# Patient Record
Sex: MALE | Employment: OTHER | URBAN - METROPOLITAN AREA
[De-identification: names, ages, dates, MRNs, and addresses within clinical notes are randomized per-mention and may not be internally consistent; named-entity substitution may affect disease eponyms.]

---

## 2021-07-22 ENCOUNTER — APPOINTMENT (OUTPATIENT)
Dept: CT IMAGING | Age: 66
End: 2021-07-22
Payer: MEDICARE

## 2021-07-22 ENCOUNTER — HOSPITAL ENCOUNTER (INPATIENT)
Age: 66
LOS: 1 days | DRG: 023 | End: 2021-07-24
Attending: EMERGENCY MEDICINE | Admitting: PSYCHIATRY & NEUROLOGY
Payer: MEDICARE

## 2021-07-22 ENCOUNTER — HOSPITAL ENCOUNTER (EMERGENCY)
Age: 66
Discharge: ANOTHER HOSPITAL | End: 2021-07-22
Payer: MEDICARE

## 2021-07-22 DIAGNOSIS — I61.9 INTRAPARENCHYMAL HEMORRHAGE OF BRAIN (HCC): Primary | ICD-10-CM

## 2021-07-22 PROCEDURE — 99291 CRITICAL CARE FIRST HOUR: CPT | Performed by: PSYCHIATRY & NEUROLOGY

## 2021-07-22 PROCEDURE — 70450 CT HEAD/BRAIN W/O DYE: CPT

## 2021-07-22 PROCEDURE — 2500000003 HC RX 250 WO HCPCS

## 2021-07-22 PROCEDURE — 6360000002 HC RX W HCPCS: Performed by: PSYCHIATRY & NEUROLOGY

## 2021-07-22 PROCEDURE — 6360000002 HC RX W HCPCS

## 2021-07-22 PROCEDURE — 99282 EMERGENCY DEPT VISIT SF MDM: CPT

## 2021-07-22 RX ADMIN — ONDANSETRON 4 MG: 2 INJECTION, SOLUTION INTRAMUSCULAR; INTRAVENOUS at 23:41

## 2021-07-22 RX ADMIN — ONDANSETRON 4 MG: 2 INJECTION, SOLUTION INTRAMUSCULAR; INTRAVENOUS at 23:10

## 2021-07-22 RX ADMIN — NICARDIPINE HYDROCHLORIDE 5 MG/HR: 0.1 INJECTION INTRAVENOUS at 23:43

## 2021-07-22 RX ADMIN — PROTHROMBIN, COAGULATION FACTOR VII HUMAN, COAGULATION FACTOR IX HUMAN, COAGULATION FACTOR X HUMAN, PROTEIN C, PROTEIN S HUMAN, AND WATER 2000 UNITS: KIT at 23:34

## 2021-07-23 ENCOUNTER — APPOINTMENT (OUTPATIENT)
Dept: CT IMAGING | Age: 66
DRG: 023 | End: 2021-07-23
Payer: MEDICARE

## 2021-07-23 ENCOUNTER — APPOINTMENT (OUTPATIENT)
Dept: MRI IMAGING | Age: 66
DRG: 023 | End: 2021-07-23
Payer: MEDICARE

## 2021-07-23 ENCOUNTER — APPOINTMENT (OUTPATIENT)
Dept: GENERAL RADIOLOGY | Age: 66
DRG: 023 | End: 2021-07-23
Payer: MEDICARE

## 2021-07-23 VITALS
OXYGEN SATURATION: 86 % | TEMPERATURE: 99.7 F | DIASTOLIC BLOOD PRESSURE: 84 MMHG | HEART RATE: 113 BPM | HEIGHT: 70 IN | BODY MASS INDEX: 37.22 KG/M2 | RESPIRATION RATE: 20 BRPM | WEIGHT: 260 LBS | SYSTOLIC BLOOD PRESSURE: 167 MMHG

## 2021-07-23 PROBLEM — I63.511 STROKE DUE TO OCCLUSION OF RIGHT MIDDLE CEREBRAL ARTERY (HCC): Status: ACTIVE | Noted: 2021-07-23

## 2021-07-23 LAB
-: NORMAL
ABO/RH: NORMAL
ALLEN TEST: ABNORMAL
ALLEN TEST: ABNORMAL
AMORPHOUS: NORMAL
AMPHETAMINE SCREEN URINE: NEGATIVE
ANION GAP SERPL CALCULATED.3IONS-SCNC: 18 MMOL/L (ref 9–17)
ANION GAP SERPL CALCULATED.3IONS-SCNC: 18 MMOL/L (ref 9–17)
ANTIBODY SCREEN: NEGATIVE
ARM BAND NUMBER: NORMAL
BACTERIA: NORMAL
BARBITURATE SCREEN URINE: NEGATIVE
BENZODIAZEPINE SCREEN, URINE: NEGATIVE
BILIRUBIN URINE: NEGATIVE
BLOOD BANK SPECIMEN: ABNORMAL
BUN BLDV-MCNC: 11 MG/DL (ref 8–23)
BUN BLDV-MCNC: 12 MG/DL (ref 8–23)
BUN/CREAT BLD: ABNORMAL (ref 9–20)
BUPRENORPHINE URINE: ABNORMAL
CALCIUM SERPL-MCNC: 8.9 MG/DL (ref 8.6–10.4)
CANNABINOID SCREEN URINE: POSITIVE
CARBOXYHEMOGLOBIN: 1 % (ref 0–5)
CASTS UA: NORMAL /LPF (ref 0–8)
CHLORIDE BLD-SCNC: 100 MMOL/L (ref 98–107)
CHLORIDE BLD-SCNC: 107 MMOL/L (ref 98–107)
CHOLESTEROL/HDL RATIO: 2
CHOLESTEROL: 152 MG/DL
CO2: 18 MMOL/L (ref 20–31)
CO2: 21 MMOL/L (ref 20–31)
COCAINE METABOLITE, URINE: NEGATIVE
COLOR: YELLOW
COMMENT UA: ABNORMAL
CREAT SERPL-MCNC: 0.44 MG/DL (ref 0.7–1.2)
CREAT SERPL-MCNC: 0.47 MG/DL (ref 0.7–1.2)
CRYSTALS, UA: NORMAL /HPF
EPITHELIAL CELLS UA: NORMAL /HPF (ref 0–5)
ESTIMATED AVERAGE GLUCOSE: 108 MG/DL
ETHANOL PERCENT: <0.01 %
ETHANOL: <10 MG/DL
EXPIRATION DATE: NORMAL
FIO2: 100
FIO2: ABNORMAL
GFR AFRICAN AMERICAN: >60 ML/MIN
GFR AFRICAN AMERICAN: ABNORMAL ML/MIN
GFR NON-AFRICAN AMERICAN: >60 ML/MIN
GFR NON-AFRICAN AMERICAN: ABNORMAL ML/MIN
GFR SERPL CREATININE-BSD FRML MDRD: ABNORMAL ML/MIN/{1.73_M2}
GLUCOSE BLD-MCNC: 122 MG/DL (ref 70–99)
GLUCOSE BLD-MCNC: 228 MG/DL (ref 70–99)
GLUCOSE URINE: NEGATIVE
HBA1C MFR BLD: 5.4 % (ref 4–6)
HCG QUALITATIVE: ABNORMAL
HCO3 VENOUS: 22.7 MMOL/L (ref 24–30)
HCT VFR BLD CALC: 44.4 % (ref 40.7–50.3)
HCT VFR BLD CALC: 45.3 % (ref 40.7–50.3)
HDLC SERPL-MCNC: 76 MG/DL
HEMOGLOBIN: 14.6 G/DL (ref 13–17)
HEMOGLOBIN: 14.7 G/DL (ref 13–17)
INR BLD: 1
KETONES, URINE: ABNORMAL
LDL CHOLESTEROL: 63 MG/DL (ref 0–130)
LEUKOCYTE ESTERASE, URINE: NEGATIVE
MCH RBC QN AUTO: 30.5 PG (ref 25.2–33.5)
MCH RBC QN AUTO: 30.5 PG (ref 25.2–33.5)
MCHC RBC AUTO-ENTMCNC: 32.2 G/DL (ref 28.4–34.8)
MCHC RBC AUTO-ENTMCNC: 33.1 G/DL (ref 28.4–34.8)
MCV RBC AUTO: 92.1 FL (ref 82.6–102.9)
MCV RBC AUTO: 94.6 FL (ref 82.6–102.9)
MDMA URINE: ABNORMAL
METHADONE SCREEN, URINE: NEGATIVE
METHAMPHETAMINE, URINE: ABNORMAL
METHEMOGLOBIN: ABNORMAL % (ref 0–1.5)
MODE: ABNORMAL
MODE: ABNORMAL
MRSA, DNA, NASAL: NORMAL
MUCUS: NORMAL
NEGATIVE BASE EXCESS, ART: ABNORMAL (ref 0–2)
NEGATIVE BASE EXCESS, VEN: 1.7 MMOL/L (ref 0–2)
NITRITE, URINE: NEGATIVE
NOTIFICATION TIME: ABNORMAL
NOTIFICATION: ABNORMAL
NRBC AUTOMATED: 0 PER 100 WBC
NRBC AUTOMATED: 0 PER 100 WBC
O2 DEVICE/FLOW/%: ABNORMAL
O2 DEVICE/FLOW/%: ABNORMAL
O2 SAT, VEN: 96.4 % (ref 60–85)
OPIATES, URINE: NEGATIVE
OTHER OBSERVATIONS UA: NORMAL
OXYCODONE SCREEN URINE: NEGATIVE
OXYHEMOGLOBIN: ABNORMAL % (ref 95–98)
PARTIAL THROMBOPLASTIN TIME: 24.3 SEC (ref 20.5–30.5)
PATIENT TEMP: 37
PATIENT TEMP: ABNORMAL
PCO2, VEN, TEMP ADJ: ABNORMAL MMHG (ref 39–55)
PCO2, VEN: 39.5 (ref 39–55)
PDW BLD-RTO: 13.2 % (ref 11.8–14.4)
PDW BLD-RTO: 13.3 % (ref 11.8–14.4)
PEEP/CPAP: ABNORMAL
PH UA: 8 (ref 5–8)
PH VENOUS: 7.38 (ref 7.32–7.42)
PH, VEN, TEMP ADJ: ABNORMAL (ref 7.32–7.42)
PHENCYCLIDINE, URINE: NEGATIVE
PLATELET # BLD: 153 K/UL (ref 138–453)
PLATELET # BLD: 214 K/UL (ref 138–453)
PMV BLD AUTO: 11.3 FL (ref 8.1–13.5)
PMV BLD AUTO: 11.9 FL (ref 8.1–13.5)
PO2, VEN, TEMP ADJ: ABNORMAL MMHG (ref 30–50)
PO2, VEN: 87.8 (ref 30–50)
POC HCO3: 26 MMOL/L (ref 21–28)
POC O2 SATURATION: 100 % (ref 94–98)
POC PCO2 TEMP: ABNORMAL MM HG
POC PCO2: 43.8 MM HG (ref 35–48)
POC PH TEMP: ABNORMAL
POC PH: 7.38 (ref 7.35–7.45)
POC PO2 TEMP: ABNORMAL MM HG
POC PO2: 373.7 MM HG (ref 83–108)
POSITIVE BASE EXCESS, ART: 0 (ref 0–3)
POSITIVE BASE EXCESS, VEN: ABNORMAL MMOL/L (ref 0–2)
POTASSIUM SERPL-SCNC: 3.5 MMOL/L (ref 3.7–5.3)
POTASSIUM SERPL-SCNC: 4 MMOL/L (ref 3.7–5.3)
PROPOXYPHENE, URINE: ABNORMAL
PROTEIN UA: ABNORMAL
PROTHROMBIN TIME: 10.3 SEC (ref 9.1–12.3)
PSV: ABNORMAL
PT. POSITION: ABNORMAL
RBC # BLD: 4.79 M/UL (ref 4.21–5.77)
RBC # BLD: 4.82 M/UL (ref 4.21–5.77)
RBC UA: NORMAL /HPF (ref 0–4)
RENAL EPITHELIAL, UA: NORMAL /HPF
RESPIRATORY RATE: ABNORMAL
SAMPLE SITE: ABNORMAL
SAMPLE SITE: ABNORMAL
SARS-COV-2, RAPID: NOT DETECTED
SET RATE: ABNORMAL
SODIUM BLD-SCNC: 139 MMOL/L (ref 135–144)
SODIUM BLD-SCNC: 143 MMOL/L (ref 135–144)
SODIUM BLD-SCNC: 146 MMOL/L (ref 135–144)
SPECIFIC GRAVITY UA: 1.01 (ref 1–1.03)
SPECIMEN DESCRIPTION: NORMAL
SPECIMEN DESCRIPTION: NORMAL
TCO2 (CALC), ART: ABNORMAL MMOL/L (ref 22–29)
TEST INFORMATION: ABNORMAL
TEXT FOR RESPIRATORY: ABNORMAL
TOTAL HB: ABNORMAL G/DL (ref 12–16)
TOTAL RATE: ABNORMAL
TRICHOMONAS: NORMAL
TRICYCLIC ANTIDEPRESSANTS, UR: ABNORMAL
TRIGL SERPL-MCNC: 67 MG/DL
TROPONIN INTERP: ABNORMAL
TROPONIN T: ABNORMAL NG/ML
TROPONIN, HIGH SENSITIVITY: 32 NG/L (ref 0–22)
TURBIDITY: CLEAR
URINE HGB: ABNORMAL
UROBILINOGEN, URINE: NORMAL
VLDLC SERPL CALC-MCNC: NORMAL MG/DL (ref 1–30)
VT: ABNORMAL
WBC # BLD: 20.2 K/UL (ref 3.5–11.3)
WBC # BLD: 8.3 K/UL (ref 3.5–11.3)
WBC UA: NORMAL /HPF (ref 0–5)
YEAST: NORMAL

## 2021-07-23 PROCEDURE — 84520 ASSAY OF UREA NITROGEN: CPT

## 2021-07-23 PROCEDURE — 6360000002 HC RX W HCPCS: Performed by: STUDENT IN AN ORGANIZED HEALTH CARE EDUCATION/TRAINING PROGRAM

## 2021-07-23 PROCEDURE — 84295 ASSAY OF SERUM SODIUM: CPT

## 2021-07-23 PROCEDURE — 009630Z DRAINAGE OF CEREBRAL VENTRICLE WITH DRAINAGE DEVICE, PERCUTANEOUS APPROACH: ICD-10-PCS | Performed by: NEUROLOGICAL SURGERY

## 2021-07-23 PROCEDURE — 94002 VENT MGMT INPAT INIT DAY: CPT

## 2021-07-23 PROCEDURE — 6360000002 HC RX W HCPCS

## 2021-07-23 PROCEDURE — G0480 DRUG TEST DEF 1-7 CLASSES: HCPCS

## 2021-07-23 PROCEDURE — 99291 CRITICAL CARE FIRST HOUR: CPT | Performed by: PSYCHIATRY & NEUROLOGY

## 2021-07-23 PROCEDURE — 2500000003 HC RX 250 WO HCPCS: Performed by: PSYCHIATRY & NEUROLOGY

## 2021-07-23 PROCEDURE — 72125 CT NECK SPINE W/O DYE: CPT

## 2021-07-23 PROCEDURE — 2580000003 HC RX 258: Performed by: STUDENT IN AN ORGANIZED HEALTH CARE EDUCATION/TRAINING PROGRAM

## 2021-07-23 PROCEDURE — 30283B1 TRANSFUSION OF NONAUTOLOGOUS 4-FACTOR PROTHROMBIN COMPLEX CONCENTRATE INTO VEIN, PERCUTANEOUS APPROACH: ICD-10-PCS | Performed by: NEUROLOGICAL SURGERY

## 2021-07-23 PROCEDURE — 70450 CT HEAD/BRAIN W/O DYE: CPT

## 2021-07-23 PROCEDURE — 80051 ELECTROLYTE PANEL: CPT

## 2021-07-23 PROCEDURE — 81001 URINALYSIS AUTO W/SCOPE: CPT

## 2021-07-23 PROCEDURE — APPNB30 APP NON BILLABLE TIME 0-30 MINS: Performed by: NURSE PRACTITIONER

## 2021-07-23 PROCEDURE — 6360000002 HC RX W HCPCS: Performed by: NURSE PRACTITIONER

## 2021-07-23 PROCEDURE — 36600 WITHDRAWAL OF ARTERIAL BLOOD: CPT

## 2021-07-23 PROCEDURE — 87635 SARS-COV-2 COVID-19 AMP PRB: CPT

## 2021-07-23 PROCEDURE — 2500000003 HC RX 250 WO HCPCS: Performed by: NURSE PRACTITIONER

## 2021-07-23 PROCEDURE — 84484 ASSAY OF TROPONIN QUANT: CPT

## 2021-07-23 PROCEDURE — 86900 BLOOD TYPING SEROLOGIC ABO: CPT

## 2021-07-23 PROCEDURE — 2500000003 HC RX 250 WO HCPCS: Performed by: STUDENT IN AN ORGANIZED HEALTH CARE EDUCATION/TRAINING PROGRAM

## 2021-07-23 PROCEDURE — 3209999900 CT THORACIC SPINE TRAUMA RECONSTRUCTION

## 2021-07-23 PROCEDURE — 51702 INSERT TEMP BLADDER CATH: CPT

## 2021-07-23 PROCEDURE — C9248 INJ, CLEVIDIPINE BUTYRATE: HCPCS | Performed by: STUDENT IN AN ORGANIZED HEALTH CARE EDUCATION/TRAINING PROGRAM

## 2021-07-23 PROCEDURE — 0BH18EZ INSERTION OF ENDOTRACHEAL AIRWAY INTO TRACHEA, VIA NATURAL OR ARTIFICIAL OPENING ENDOSCOPIC: ICD-10-PCS | Performed by: EMERGENCY MEDICINE

## 2021-07-23 PROCEDURE — 71045 X-RAY EXAM CHEST 1 VIEW: CPT

## 2021-07-23 PROCEDURE — 80048 BASIC METABOLIC PNL TOTAL CA: CPT

## 2021-07-23 PROCEDURE — 2000000003 HC NEURO ICU R&B

## 2021-07-23 PROCEDURE — 70498 CT ANGIOGRAPHY NECK: CPT

## 2021-07-23 PROCEDURE — 36415 COLL VENOUS BLD VENIPUNCTURE: CPT

## 2021-07-23 PROCEDURE — 06HY33Z INSERTION OF INFUSION DEVICE INTO LOWER VEIN, PERCUTANEOUS APPROACH: ICD-10-PCS | Performed by: EMERGENCY MEDICINE

## 2021-07-23 PROCEDURE — 82565 ASSAY OF CREATININE: CPT

## 2021-07-23 PROCEDURE — 6360000004 HC RX CONTRAST MEDICATION: Performed by: SURGERY

## 2021-07-23 PROCEDURE — 83036 HEMOGLOBIN GLYCOSYLATED A1C: CPT

## 2021-07-23 PROCEDURE — 5A1935Z RESPIRATORY VENTILATION, LESS THAN 24 CONSECUTIVE HOURS: ICD-10-PCS | Performed by: SURGERY

## 2021-07-23 PROCEDURE — 85027 COMPLETE CBC AUTOMATED: CPT

## 2021-07-23 PROCEDURE — 82803 BLOOD GASES ANY COMBINATION: CPT

## 2021-07-23 PROCEDURE — 85610 PROTHROMBIN TIME: CPT

## 2021-07-23 PROCEDURE — 71260 CT THORAX DX C+: CPT

## 2021-07-23 PROCEDURE — C9248 INJ, CLEVIDIPINE BUTYRATE: HCPCS | Performed by: NURSE PRACTITIONER

## 2021-07-23 PROCEDURE — 82805 BLOOD GASES W/O2 SATURATION: CPT

## 2021-07-23 PROCEDURE — 87641 MR-STAPH DNA AMP PROBE: CPT

## 2021-07-23 PROCEDURE — 86901 BLOOD TYPING SEROLOGIC RH(D): CPT

## 2021-07-23 PROCEDURE — 6370000000 HC RX 637 (ALT 250 FOR IP): Performed by: PSYCHIATRY & NEUROLOGY

## 2021-07-23 PROCEDURE — 82947 ASSAY GLUCOSE BLOOD QUANT: CPT

## 2021-07-23 PROCEDURE — 3209999900 CT LUMBAR SPINE TRAUMA RECONSTRUCTION

## 2021-07-23 PROCEDURE — 85730 THROMBOPLASTIN TIME PARTIAL: CPT

## 2021-07-23 PROCEDURE — 80307 DRUG TEST PRSMV CHEM ANLYZR: CPT

## 2021-07-23 PROCEDURE — 84703 CHORIONIC GONADOTROPIN ASSAY: CPT

## 2021-07-23 PROCEDURE — 94761 N-INVAS EAR/PLS OXIMETRY MLT: CPT

## 2021-07-23 PROCEDURE — 2500000003 HC RX 250 WO HCPCS

## 2021-07-23 PROCEDURE — 86850 RBC ANTIBODY SCREEN: CPT

## 2021-07-23 PROCEDURE — 80061 LIPID PANEL: CPT

## 2021-07-23 RX ORDER — LABETALOL HYDROCHLORIDE 5 MG/ML
20 INJECTION, SOLUTION INTRAVENOUS EVERY 4 HOURS PRN
Status: DISCONTINUED | OUTPATIENT
Start: 2021-07-23 | End: 2021-07-23

## 2021-07-23 RX ORDER — GLYCOPYRROLATE 0.2 MG/ML
0.2 INJECTION INTRAMUSCULAR; INTRAVENOUS EVERY 4 HOURS PRN
Status: DISCONTINUED | OUTPATIENT
Start: 2021-07-23 | End: 2021-07-24 | Stop reason: HOSPADM

## 2021-07-23 RX ORDER — HYDRALAZINE HYDROCHLORIDE 20 MG/ML
10 INJECTION INTRAMUSCULAR; INTRAVENOUS
Status: DISCONTINUED | OUTPATIENT
Start: 2021-07-23 | End: 2021-07-23

## 2021-07-23 RX ORDER — 3% SODIUM CHLORIDE 3 G/100ML
50 INJECTION, SOLUTION INTRAVENOUS CONTINUOUS
Status: DISCONTINUED | OUTPATIENT
Start: 2021-07-23 | End: 2021-07-23

## 2021-07-23 RX ORDER — LEVETIRACETAM 10 MG/ML
INJECTION INTRAVASCULAR
Status: DISCONTINUED
Start: 2021-07-23 | End: 2021-07-23

## 2021-07-23 RX ORDER — LABETALOL HYDROCHLORIDE 5 MG/ML
10 INJECTION, SOLUTION INTRAVENOUS EVERY 4 HOURS PRN
Status: DISCONTINUED | OUTPATIENT
Start: 2021-07-23 | End: 2021-07-23

## 2021-07-23 RX ORDER — DEXTROSE MONOHYDRATE 50 MG/ML
100 INJECTION, SOLUTION INTRAVENOUS PRN
Status: DISCONTINUED | OUTPATIENT
Start: 2021-07-23 | End: 2021-07-23

## 2021-07-23 RX ORDER — LEVETIRACETAM 10 MG/ML
1000 INJECTION INTRAVASCULAR EVERY 12 HOURS
Status: DISCONTINUED | OUTPATIENT
Start: 2021-07-23 | End: 2021-07-23

## 2021-07-23 RX ORDER — DEXTROSE MONOHYDRATE 25 G/50ML
12.5 INJECTION, SOLUTION INTRAVENOUS PRN
Status: DISCONTINUED | OUTPATIENT
Start: 2021-07-23 | End: 2021-07-23

## 2021-07-23 RX ORDER — LEVETIRACETAM 10 MG/ML
1000 INJECTION INTRAVASCULAR ONCE
Status: DISCONTINUED | OUTPATIENT
Start: 2021-07-23 | End: 2021-07-23

## 2021-07-23 RX ORDER — LABETALOL 200 MG/1
200 TABLET, FILM COATED ORAL EVERY 8 HOURS SCHEDULED
Status: DISCONTINUED | OUTPATIENT
Start: 2021-07-23 | End: 2021-07-23

## 2021-07-23 RX ORDER — CHLORHEXIDINE GLUCONATE 0.12 MG/ML
15 RINSE ORAL 2 TIMES DAILY
Status: DISCONTINUED | OUTPATIENT
Start: 2021-07-23 | End: 2021-07-23

## 2021-07-23 RX ORDER — NICOTINE POLACRILEX 4 MG
15 LOZENGE BUCCAL PRN
Status: DISCONTINUED | OUTPATIENT
Start: 2021-07-23 | End: 2021-07-23

## 2021-07-23 RX ORDER — NICARDIPINE HYDROCHLORIDE 0.1 MG/ML
INJECTION INTRAVENOUS
Status: COMPLETED
Start: 2021-07-23 | End: 2021-07-23

## 2021-07-23 RX ORDER — LEVETIRACETAM 5 MG/ML
500 INJECTION INTRAVASCULAR EVERY 12 HOURS
Status: DISCONTINUED | OUTPATIENT
Start: 2021-07-23 | End: 2021-07-24 | Stop reason: HOSPADM

## 2021-07-23 RX ORDER — MORPHINE SULFATE 4 MG/ML
4 INJECTION, SOLUTION INTRAMUSCULAR; INTRAVENOUS
Status: DISCONTINUED | OUTPATIENT
Start: 2021-07-23 | End: 2021-07-24 | Stop reason: HOSPADM

## 2021-07-23 RX ORDER — ACETAMINOPHEN 325 MG/1
650 TABLET ORAL EVERY 4 HOURS PRN
Status: DISCONTINUED | OUTPATIENT
Start: 2021-07-23 | End: 2021-07-23

## 2021-07-23 RX ORDER — ONDANSETRON 2 MG/ML
4 INJECTION INTRAMUSCULAR; INTRAVENOUS EVERY 6 HOURS PRN
Status: DISCONTINUED | OUTPATIENT
Start: 2021-07-23 | End: 2021-07-23

## 2021-07-23 RX ORDER — ONDANSETRON 2 MG/ML
INJECTION INTRAMUSCULAR; INTRAVENOUS DAILY PRN
Status: DISCONTINUED | OUTPATIENT
Start: 2021-07-22 | End: 2021-07-23 | Stop reason: HOSPADM

## 2021-07-23 RX ORDER — MORPHINE SULFATE 2 MG/ML
2 INJECTION, SOLUTION INTRAMUSCULAR; INTRAVENOUS
Status: DISCONTINUED | OUTPATIENT
Start: 2021-07-23 | End: 2021-07-24 | Stop reason: HOSPADM

## 2021-07-23 RX ORDER — LORAZEPAM 2 MG/ML
0.5 INJECTION INTRAMUSCULAR
Status: DISCONTINUED | OUTPATIENT
Start: 2021-07-23 | End: 2021-07-24 | Stop reason: HOSPADM

## 2021-07-23 RX ORDER — MANNITOL 20 G/100ML
120 INJECTION, SOLUTION INTRAVENOUS ONCE
Status: COMPLETED | OUTPATIENT
Start: 2021-07-23 | End: 2021-07-23

## 2021-07-23 RX ORDER — SODIUM CHLORIDE 234 MG/ML
60 INJECTION, SOLUTION INTRAVENOUS ONCE
Status: DISCONTINUED | OUTPATIENT
Start: 2021-07-23 | End: 2021-07-23

## 2021-07-23 RX ORDER — MANNITOL 20 G/100ML
150 INJECTION, SOLUTION INTRAVENOUS ONCE
Status: COMPLETED | OUTPATIENT
Start: 2021-07-23 | End: 2021-07-23

## 2021-07-23 RX ORDER — LABETALOL HYDROCHLORIDE 5 MG/ML
10 INJECTION, SOLUTION INTRAVENOUS
Status: DISCONTINUED | OUTPATIENT
Start: 2021-07-23 | End: 2021-07-23

## 2021-07-23 RX ORDER — MANNITOL 20 G/100ML
INJECTION, SOLUTION INTRAVENOUS
Status: COMPLETED
Start: 2021-07-23 | End: 2021-07-23

## 2021-07-23 RX ORDER — ONDANSETRON 4 MG/1
4 TABLET, ORALLY DISINTEGRATING ORAL EVERY 8 HOURS PRN
Status: DISCONTINUED | OUTPATIENT
Start: 2021-07-23 | End: 2021-07-23

## 2021-07-23 RX ORDER — SODIUM CHLORIDE 9 MG/ML
50 INJECTION, SOLUTION INTRAVENOUS ONCE
Status: DISCONTINUED | OUTPATIENT
Start: 2021-07-23 | End: 2021-07-24 | Stop reason: HOSPADM

## 2021-07-23 RX ORDER — HYDRALAZINE HYDROCHLORIDE 20 MG/ML
10 INJECTION INTRAMUSCULAR; INTRAVENOUS EVERY 6 HOURS PRN
Status: DISCONTINUED | OUTPATIENT
Start: 2021-07-23 | End: 2021-07-23

## 2021-07-23 RX ORDER — MANNITOL 20 G/100ML
INJECTION, SOLUTION INTRAVENOUS
Status: DISCONTINUED
Start: 2021-07-23 | End: 2021-07-23

## 2021-07-23 RX ORDER — SODIUM CHLORIDE 234 MG/ML
60 INJECTION, SOLUTION INTRAVENOUS ONCE
Status: COMPLETED | OUTPATIENT
Start: 2021-07-23 | End: 2021-07-23

## 2021-07-23 RX ORDER — ATORVASTATIN CALCIUM 20 MG/1
20 TABLET, FILM COATED ORAL NIGHTLY
Status: DISCONTINUED | OUTPATIENT
Start: 2021-07-23 | End: 2021-07-23

## 2021-07-23 RX ORDER — NICARDIPINE HYDROCHLORIDE 0.1 MG/ML
3-15 INJECTION INTRAVENOUS CONTINUOUS
Status: DISCONTINUED | OUTPATIENT
Start: 2021-07-23 | End: 2021-07-23

## 2021-07-23 RX ORDER — LORAZEPAM 2 MG/ML
1 INJECTION INTRAMUSCULAR
Status: DISCONTINUED | OUTPATIENT
Start: 2021-07-23 | End: 2021-07-24 | Stop reason: HOSPADM

## 2021-07-23 RX ORDER — FENTANYL CITRATE 50 UG/ML
INJECTION, SOLUTION INTRAMUSCULAR; INTRAVENOUS
Status: COMPLETED
Start: 2021-07-23 | End: 2021-07-23

## 2021-07-23 RX ORDER — NICARDIPINE HYDROCHLORIDE 0.1 MG/ML
INJECTION INTRAVENOUS CONTINUOUS PRN
Status: DISCONTINUED | OUTPATIENT
Start: 2021-07-22 | End: 2021-07-23 | Stop reason: HOSPADM

## 2021-07-23 RX ADMIN — MANNITOL 120 G: 20 INJECTION, SOLUTION INTRAVENOUS at 10:12

## 2021-07-23 RX ADMIN — MORPHINE SULFATE 2 MG: 2 INJECTION, SOLUTION INTRAMUSCULAR; INTRAVENOUS at 16:37

## 2021-07-23 RX ADMIN — MORPHINE SULFATE 4 MG: 4 INJECTION INTRAVENOUS at 17:20

## 2021-07-23 RX ADMIN — FENTANYL CITRATE 75 MCG: 50 INJECTION, SOLUTION INTRAMUSCULAR; INTRAVENOUS at 02:52

## 2021-07-23 RX ADMIN — CLEVIPIDINE 2 MG/HR: 0.5 EMULSION INTRAVENOUS at 01:59

## 2021-07-23 RX ADMIN — LORAZEPAM 1 MG: 2 INJECTION INTRAMUSCULAR; INTRAVENOUS at 18:10

## 2021-07-23 RX ADMIN — SODIUM CHLORIDE 50 ML/HR: 3 INJECTION, SOLUTION INTRAVENOUS at 14:55

## 2021-07-23 RX ADMIN — MORPHINE SULFATE 4 MG: 4 INJECTION INTRAVENOUS at 18:09

## 2021-07-23 RX ADMIN — IOPAMIDOL 130 ML: 755 INJECTION, SOLUTION INTRAVENOUS at 00:26

## 2021-07-23 RX ADMIN — LORAZEPAM 1 MG: 2 INJECTION INTRAMUSCULAR; INTRAVENOUS at 17:20

## 2021-07-23 RX ADMIN — LEVETIRACETAM 2000 MG: 100 INJECTION, SOLUTION INTRAVENOUS at 02:35

## 2021-07-23 RX ADMIN — CLEVIPIDINE 18 MG/HR: 0.5 EMULSION INTRAVENOUS at 09:49

## 2021-07-23 RX ADMIN — Medication 1500 UNITS: at 01:35

## 2021-07-23 RX ADMIN — Medication 12.5 MCG/HR: at 04:53

## 2021-07-23 RX ADMIN — MORPHINE SULFATE 4 MG: 4 INJECTION INTRAVENOUS at 18:54

## 2021-07-23 RX ADMIN — LORAZEPAM 1 MG: 2 INJECTION INTRAMUSCULAR; INTRAVENOUS at 19:53

## 2021-07-23 RX ADMIN — LORAZEPAM 1 MG: 2 INJECTION INTRAMUSCULAR; INTRAVENOUS at 18:54

## 2021-07-23 RX ADMIN — LORAZEPAM 1 MG: 2 INJECTION INTRAMUSCULAR; INTRAVENOUS at 16:36

## 2021-07-23 RX ADMIN — MANNITOL 150 G: 20 INJECTION, SOLUTION INTRAVENOUS at 01:23

## 2021-07-23 RX ADMIN — SODIUM CHLORIDE 50 ML/HR: 3 INJECTION, SOLUTION INTRAVENOUS at 04:47

## 2021-07-23 RX ADMIN — LORAZEPAM 1 MG: 2 INJECTION INTRAMUSCULAR; INTRAVENOUS at 17:42

## 2021-07-23 RX ADMIN — LABETALOL HYDROCHLORIDE 200 MG: 200 TABLET, FILM COATED ORAL at 10:00

## 2021-07-23 RX ADMIN — MORPHINE SULFATE 4 MG: 4 INJECTION INTRAVENOUS at 17:42

## 2021-07-23 RX ADMIN — SODIUM CHLORIDE 240 MEQ: 234 INJECTION, SOLUTION, CONCENTRATE INTRAVENOUS at 10:16

## 2021-07-23 RX ADMIN — HYDRALAZINE HYDROCHLORIDE 10 MG: 20 INJECTION INTRAMUSCULAR; INTRAVENOUS at 09:27

## 2021-07-23 RX ADMIN — MORPHINE SULFATE 4 MG: 4 INJECTION INTRAVENOUS at 19:53

## 2021-07-23 RX ADMIN — SODIUM CHLORIDE 240 MEQ: 234 INJECTION INTRAMUSCULAR; INTRAVENOUS; SUBCUTANEOUS at 02:47

## 2021-07-23 RX ADMIN — NICARDIPINE HYDROCHLORIDE 15 MG/HR: 0.1 INJECTION INTRAVENOUS at 01:41

## 2021-07-23 RX ADMIN — CLEVIPIDINE 4 MG/HR: 0.5 EMULSION INTRAVENOUS at 14:56

## 2021-07-23 RX ADMIN — FAMOTIDINE 20 MG: 10 INJECTION INTRAVENOUS at 08:44

## 2021-07-23 RX ADMIN — CHLORHEXIDINE GLUCONATE 0.12% ORAL RINSE 15 ML: 1.2 LIQUID ORAL at 08:45

## 2021-07-23 ASSESSMENT — PAIN SCALES - GENERAL: PAINLEVEL_OUTOF10: 10

## 2021-07-23 ASSESSMENT — PULMONARY FUNCTION TESTS
PIF_VALUE: 16
PIF_VALUE: 22
PIF_VALUE: 23
PIF_VALUE: 22

## 2021-07-23 NOTE — PROCEDURES
Arterial Line Placement Procedure Note                     Indication: Need for serial blood work, arterial blood gases, mechanical ventilation and need for strict BP control in setting of ICH    Consent: The family members were counseled regarding the procedure, its indications, risks, potential complications and alternatives, and any questions were answered. Consent was obtained to proceed. Karl's Test: Not performed    Procedure: The skin over the right radial artery was prepped with chlorhexidine and Chloraprep. Local anesthesia was not performed. A 20 gauge arterial line catheter was then inserted, over a needle, into the vessel. The transducer set was then attached and securely fastened to the skin without sutures and with an adhesive dressing. Waveforms on the monitor were observed and found to be adequate. The patient had good distal perfusion after the procedure. The site was then dressed in a sterile fashion. The patient tolerated the procedure well.      Complications: None    CHETAN Salazar CNP  07/23/21 9:15 AM

## 2021-07-23 NOTE — H&P
Neuro ICU History & Physical    Patient Name: Charmaine FAJARDO  Patient : [id number]  Room/Bed:   Allergies: Not on File  Problem List: There is no problem list on file for this patient. CHIEF COMPLAINT     Right hemiplegia, complete aphasia    HPI    History Obtained From: Medical chart and medical staff    The patient is a 81 y.o. male with past medical history of hypertension, hyperlipidemia, right frontal stroke in 2018, atrial fibrillation on Eliquis presented with mobile stroke unit with right hemiplegia and complete expressive aphasia. LKW 9:30 pm. Compliant with eliquis. Initial NIHSS is 18. CTH in the [facility] showed Left thalamic hemorrhage 3.2 cm in size with subarachnoid extension in the   ventricular system. Initial SBP >200. Cardene was started. The pt was transferred to [facility] for further management. In route, the patient received 2500 units of Kcentra. At bedside, the patient was intubated and received paralytics. Of sedation. Pupils were 4 mm bilaterally and nonreactive. Repeated CT head was done and showed Left frontotemporal lobar acute intraparenchymal hemorrhage,  with surrounding vasogenic edema. Extension of hemorrhage into the left midbrain with surrounding vasogenic edema. Extension of acute hemorrhage within the ventricular system involving left greater than right lateral ventricles, 3rd ventricle. CTA of the head and neck with spot sign indicating active bleeding. 1500 units of Kcentra, mannitol 150 g and 23.4% hypertonic saline 60 mL were ordered stat. Neurosurgery was consulted stat and recommended EVD. CBC and BMP within normal limits with sodium around 139. ICH SCORE  (Estimate 30 Day Mortality)   POINTS   Age    < 80  ? 80     [x] 0  [] 1   GCS:    13-15                                 5-12                                   3-4         [] 0  [x] 1  [] 2   ICH Volume     < 30 mL (cc)                      ?  30 mL (cc)      [] 0  [x] 1   IVH      No Yes      [] 0  [x] 1   Location    Supratentorial Origin                   Infratentorial Origin     [] 0  [x] 1   TOTAL 4   0= 10%             1=13%           2= 26%              3=72%             4 = 97%         5 & 6 = 100%    Initial CT head in the [facility]      Repeated CTH        Admitted to ICU From: ED  Reason for ICU Admission: Intracranial bleed       PATIENT HISTORY   Past Medical History:    No past medical history on file. Past Surgical History:    No past surgical history on file. Social History:   Social History     Socioeconomic History    Marital status: Not on file     Spouse name: Not on file    Number of children: Not on file    Years of education: Not on file    Highest education level: Not on file   Occupational History    Not on file   Tobacco Use    Smoking status: Not on file   Substance and Sexual Activity    Alcohol use: Not on file    Drug use: Not on file    Sexual activity: Not on file   Other Topics Concern    Not on file   Social History Narrative    Not on file     Social Determinants of Health     Financial Resource Strain:     Difficulty of Paying Living Expenses:    Food Insecurity:     Worried About Running Out of Food in the Last Year:     [address] in the Last Year:    Transportation Needs:     Lack of Transportation (Medical):      Lack of Transportation (Non-Medical):    Physical Activity:     Days of Exercise per Week:     Minutes of Exercise per Session:    Stress:     Feeling of Stress :    Social Connections:     Frequency of Communication with Friends and Family:     Frequency of Social Gatherings with Friends and Family:     Attends [employer] Services:     Active Member of Clubs or Organizations:     Attends Club or Organization Meetings:     Marital Status:    Intimate Partner Violence:     Fear of Current or Ex-Partner:     Emotionally Abused:     Physically Abused:     Sexually Abused:        Family History:   No family history on file. Allergies:    Patient has no allergy information on record. Medications Prior to Admission:    Not in a hospital admission. Current Medications:  Current Facility-Administered Medications: levETIRAcetam (KEPPRA) 1000 mg/100 mL IVPB, 1,000 mg, Intravenous, Once  0.9 % sodium chloride infusion, 50 mL, Intravenous, Once  levETIRAcetam in NaCl (KEPPRA) 1000 MG/100ML solution, , ,   fentaNYL (SUBLIMAZE) 100 MCG/2ML injection, , ,   mannitol 20 % IVPB 150 g, 150 g, Intravenous, Once  prothrombin complex concentrate (human) (KCENTRA) infusion 1,500 Units, 1,500 Units, Intravenous, Once  0.9 % sodium chloride infusion, 50 mL, Intravenous, Once  mannitol 20 % IVPB, , ,   sodium chloride 23.4% IVPB (Central Line) 240 mEq, 60 mL, Intravenous, Once    REVIEW OF SYSTEMS     Intubated, unresponsive    PHYSICAL EXAM:     There were no vitals taken for this visit. There is no height or weight on file to calculate BMI.  []<16 Severe malnutrition  []16-16.99 Moderate malnutrition  []17-18.49 Mild malnutrition  []18.5-24.9 Normal  []25-29.9 Overweight (not obese)  []30-34.9 Obese class 1 (Low Risk)  []35-39.9 Obese class 2 (Moderate Risk)  []?40 Obese class 3 (High Risk)    PHYSICAL EXAM:  Intubated, responsive, the patient recently received paralytics  Pupils are 4 mm round and non reactive bilaterally, no corneals, no cough or gag reflex. The patient is not moving extremities to pain.     We will examine the patient after 1 to 2 hours  LABS AND IMAGING:     RECENT LABS:  CBC with Differential:    Lab Results   Component Value Date    WBC 8.3 07/23/2021    RBC 4.82 07/23/2021    HGB 14.7 07/23/2021    HCT 44.4 07/23/2021     07/23/2021    MCV 92.1 07/23/2021    MCH 30.5 07/23/2021    MCHC 33.1 07/23/2021    RDW 13.2 07/23/2021     BMP:    Lab Results   Component Value Date     07/23/2021    K 3.5 07/23/2021     07/23/2021    CO2 21 07/23/2021    BUN 12 07/23/2021 CREATININE 0.47 07/23/2021    GFRAA NOT REPORTED 07/23/2021    LABGLOM NOT REPORTED 07/23/2021    GLUCOSE 122 07/23/2021       RADIOLOGY:   CT HEAD WO CONTRAST    Addendum Date: 7/23/2021    ADDENDUM: The left frontotemporal lobar acute intraparenchymal hemorrhage also extends into and involves the left thalamus. Result Date: 7/23/2021  EXAMINATION: CT OF THE HEAD WITHOUT CONTRAST  7/23/2021 12:21 am TECHNIQUE: CT of the head was performed without the administration of intravenous contrast. Dose modulation, iterative reconstruction, and/or weight based adjustment of the mA/kV was utilized to reduce the radiation dose to as low as reasonably achievable. COMPARISON: None. HISTORY: ORDERING SYSTEM PROVIDED HISTORY: trauma TECHNOLOGIST PROVIDED HISTORY: trauma Reason for Exam: trauma Acuity: Unknown Type of Exam: Unknown Mechanism of Injury: fall FINDINGS: BRAIN/VENTRICLES: Large lobar intraparenchymal acute hemorrhage is seen involving the left frontotemporal lobes which measures 7.0 cm anteroposterior by 3.8 cm transverse by 5.4 cm craniocaudad with extension of acute hemorrhage into the ventricular system involving the left greater than right lateral, 3rd ventricles. Extension of hemorrhage into the left midbrain is also seen with surrounding vasogenic edema present. Mass effect related to in ventricular hemorrhage and the lobar hemorrhage causes rightward midline shift at the level of the septum pellucidum measuring 16 mm consistent with subfalcine herniation. Prominence of the lateral ventricles is seen suggesting CSF outflow obstruction. Multifocal hypoattenuation within cerebral white matter is seen consistent with moderate to severe microvascular ischemic disease. ORBITS: The visualized portion of the orbits demonstrate no acute abnormality. SINUSES: The visualized paranasal sinuses and mastoid air cells demonstrate no acute abnormality.  SOFT TISSUES/SKULL:  No acute abnormality of the visualized skull or soft tissues. 1. Left frontotemporal lobar acute intraparenchymal hemorrhage, with dimensions as discussed above, with surrounding vasogenic edema. 2. Extension of hemorrhage into the left midbrain with surrounding vasogenic edema. 3. Extension of acute hemorrhage within the ventricular system involving left greater than right lateral ventricles, 3rd ventricle. 4. Suspected associated CSF outflow obstruction with hydrocephalus involving the bilateral lateral ventricles. 5. Mass effect causes rightward midline shift at the level of the septum pellucidum measuring 16 mm consistent with subfalcine herniation. 6. Recommend neurosurgical consultation. 7. Moderate to severe cerebral white matter microvascular ischemic disease. Critical results were called by [facility] Core team to Dr. Beryle Contras On 7/23/2021 at 00:43. Dr. Beryle Contras reportedly unavailable to speak with radiologist but reports he is aware of findings. XR CHEST PORTABLE    Result Date: 7/23/2021  EXAMINATION: ONE XRAY VIEW OF THE CHEST 7/23/2021 12:32 am COMPARISON: None. HISTORY: ORDERING SYSTEM PROVIDED HISTORY: trauma TECHNOLOGIST PROVIDED HISTORY: trauma Reason for Exam: supine,et placement, poss stroke  fall Acuity: Unknown Type of Exam: Unknown FINDINGS: Enteric tube terminates 3 cm above the joel. Mile perihilar pulmonary vasculature and edema. Heart is mildly large in size. Enteric tube tip not visualized. However the enteric tube extends below the level of the hemidiaphragms. Minimal atelectasis     Satisfactory position endotracheal tube. Cardiomegaly with perihilar congestion and edema. CTA HEAD NECK W CONTRAST    Result Date: 7/23/2021  EXAMINATION: CTA OF THE HEAD AND NECK WITH CONTRAST 7/23/2021 12:24 am: TECHNIQUE: CTA of the head and neck was performed with the administration of intravenous contrast. Multiplanar reformatted images are provided for review. MIP images are provided for review.  Stenosis of the internal carotid arteries measured using NASCET criteria. Dose modulation, iterative reconstruction, and/or weight based adjustment of the mA/kV was utilized to reduce the radiation dose to as low as reasonably achievable. COMPARISON: None. HISTORY: ORDERING SYSTEM PROVIDED HISTORY: Head bleed TECHNOLOGIST PROVIDED HISTORY: Head bleed Decision Support Exception - unselect if not a suspected or confirmed emergency medical condition->Emergency Medical Condition (MA) FINDINGS: CTA NECK: AORTIC ARCH/ARCH VESSELS: No dissection or arterial injury. No significant stenosis of the brachiocephalic or subclavian arteries. CAROTID ARTERIES: No dissection, arterial injury, or hemodynamically significant stenosis by NASCET criteria. VERTEBRAL ARTERIES: No dissection, arterial injury, or significant stenosis. SOFT TISSUES: The lung apices are clear. No cervical or superior mediastinal lymphadenopathy. The larynx and pharynx are unremarkable. No acute abnormality of the salivary and thyroid glands. BONES: No acute osseous abnormality. CTA HEAD: ANTERIOR CIRCULATION: No significant stenosis of the intracranial internal carotid, anterior cerebral, or middle cerebral arteries. No aneurysm. POSTERIOR CIRCULATION: No significant stenosis of the vertebral, basilar, or posterior cerebral arteries. No aneurysm. OTHER: No dural venous sinus thrombosis on this non-dedicated study. BRAIN: Redemonstration of the left frontotemporal and thalamic acute intraparenchymal hemorrhage with surrounding vasogenic edema is seen with extension into the ventricular system involving the left greater than right lateral ventricle and 3rd ventricles. Suspected CSF outflow obstruction persists with asymmetric enlargement of the bilateral lateral ventricles. Focal small blush of active extravasation is noted centrally within the posterior left frontal portion of the acute intraparenchymal hemorrhage measuring 7 mm in diameter.      Focal small 7 mm diameter blush of active extravasation seen centrally within the left frontotemporal acute intraparenchymal hemorrhage within the posterior aspect of the frontal lobe. Finding concerning for rapidly expanding intraparenchymal acute hemorrhage collection. Unchanged extent of left frontotemporal and left thalamic acute intraparenchymal hemorrhage with intraventricular extension. Otherwise, unremarkable CT angiogram of the head and neck. CT CHEST ABDOMEN PELVIS W CONTRAST    Result Date: 7/23/2021  EXAMINATION: CT OF THE CHEST, ABDOMEN, AND PELVIS WITH CONTRAST 7/23/2021 12:30 am TECHNIQUE: CT of the chest, abdomen and pelvis was performed with the administration of intravenous contrast. Multiplanar reformatted images are provided for review. Dose modulation, iterative reconstruction, and/or weight based adjustment of the mA/kV was utilized to reduce the radiation dose to as low as reasonably achievable. COMPARISON: None HISTORY: ORDERING SYSTEM PROVIDED HISTORY: trauma TECHNOLOGIST PROVIDED HISTORY: trauma Decision Support Exception - unselect if not a suspected or confirmed emergency medical condition->Emergency Medical Condition (MA) Reason for Exam: trauma Acuity: Unknown Type of Exam: Unknown Mechanism of Injury: fall Unwitnessed fall FINDINGS: Chest: Mediastinum:  No evidence of mediastinal hematoma or pneumomediastinum. No acute traumatic injury to the heart or pericardium. There is three-vessel coronary artery disease. The patient is intubated and a gastric tube is in place. No mediastinal adenopathy. Moderate atherosclerotic plaque. Lungs/pleura:  No acute traumatic injury to the lungs. No evidence of pulmonary contusion or laceration. No evidence of effusion or pneumothorax. Soft Tissues/Bones:  No acute displaced rib fracture or chest wall hematoma. The clavicles, scapulae and sternum appear intact. Remote instrumentation of the left humeral head. Please see separately dictated thoracic spine CT for findings in this region. Abdomen/Pelvis: Organs: No evidence of acute traumatic injury to the solid organs. There is excreted contrast in renal collecting systems. The liver, spleen, pancreas, kidneys and adrenal glands are without acute findings. The gallbladder is present without radiopaque cholelithiasis. There are bilateral parapelvic cysts. GI/Bowel: No mechanical bowel obstruction. No evidence of acute traumatic injury to the bowel. Pelvis: The urinary bladder contains a Novak catheter and excreted contrast. No evidence of bladder injury. No pelvic hematoma. The prostate and seminal vesicles are without acute findings. Peritoneum/Retroperitoneum: Moderate to severe atherosclerotic plaque. No hemoperitoneum or pneumoperitoneum. No mesenteric hematoma. Bones/Soft Tissues: No pelvic fracture. Please see separately dictated lumbar spine CT for findings in this region. No evidence of acute traumatic injury to the chest, abdomen or pelvis. Three-vessel coronary artery disease and moderate to severe atherosclerotic plaque. Labs and Images reviewed with:    [] Brianna Solis MD    [] Ayesha Scott MD  [] Doretha Mendez MD  --[] there are no new interval images to review. ASSESSMENT AND PLAN:         ASSESSMENT:     dany on Eliquis presented with mobile stroke unit with right hemiplegia and complete expressive aphasia. Was found to have right thalamic bleed with extension to the midbrain bleed and IVH. Patient care will be discussed with attending, will reevalu ate patient along with attending. PLAN/MEDICAL DECISION MAKING:        NEUROLOGIC:  - Imaging initial CT head with right thalamic bleed and IVH. Repeated CT head with extension of the right thalamic bleed, midline shift 16 mm, extension of the IVH with early signs of hydro-, midbrain bleed.   CTA with spot sign.  - AEDs loaded with 3 g of Keppra, continue 1 g twice daily as maintenance dose.  - LTME  - Goal SBP < 140, on Cardene drip  -Mannitol 150 g once, bolused of 23.4% hypertonic saline 60 mL. -Repeat CT head in 6 to 8 hours.  -Neurosurgery was consulted, EVD and possible hematoma evacuation.  - Neuro checks per protocol    CARDIOVASCULAR:  - Goal SBP < 140  - On Clevidipine drip  - Echo is pending  - Continue telemetry    PULMONARY:  -PRVC  -Daily ABGs  -Daily chest x-rays    RENAL/FLUID/ELECTROLYTE:  -Kidney function test within normal limits  -Monitor urine output   - IVF: 50 cc of 3% hypertonic saline, sodium checks every 6 hours. Initial Na was 139.   - Replace electrolytes PRN  - Daily BMP    GI/NUTRITION:  NUTRITION:  No diet orders on file  - Bowel regimen: GlycoLax  - GI prophylaxis: Pepcid twice daily    ID:  -Afebrile  -White blood cell within normal limits. - Continue to monitor for fevers  - Daily CBC    HEME:   -Hemoglobin is 14.7  - Platelets 143  - Daily CBC    ENDOCRINE:  - Continue to monitor blood glucose, goal <180      OTHER:  - PT/OT/ST   - Code Status: Full    PROPHYLAXIS:  Stress ulcer: H2 blocker    DVT PROPHYLAXIS:  - SCD sleeves - Thigh High   - TRUDY stockings - Thigh High  - No chemoprophylaxis anticoagulation at this time.       DISPOSITION: NICU      [address] Yandy Francisco MD  Neuro Critical Care Service   7/23/2021     1:19 AM

## 2021-07-23 NOTE — PROGRESS NOTES
Physical Therapy        Physical Therapy Cancel Note      DATE: 2021    NAME: Jayme Duong  MRN: [id number]   : 1955      Patient not seen this date for Physical Therapy due to: Other: pt not medically appropriate for PT at this time.       Electronically signed by Phylicia Byrd PT on 2021 at 8:47 AM

## 2021-07-23 NOTE — CONSULTS
Neurosurgery Consult note    Patient Name: Daryl FAJARDO  Patient : 1955  Room/Bed: [facility]6924-68  Allergies: Not on File  Problem List:   Patient Active Problem List   Diagnosis    Stroke due to occlusion of right middle cerebral artery (HCC)       CHIEF COMPLAINT     Right hemiplegia, complete aphasia    HPI    History Obtained From: Medical chart and medical staff    The patient is a 73 y.o. male with past medical history of hypertension, hyperlipidemia, right frontal stroke in 2018, atrial fibrillation on Eliquis presented with mobile stroke unit with right hemiplegia and complete expressive aphasia. LKW 9:30 pm. Compliant with eliquis. Initial NIHSS is 18. CTH in the MSU showed Left thalamic hemorrhage 3.2 cm in size with subarachnoid extension in the   ventricular system. Initial SBP >200. Cardene was started. The pt was transferred to [facility] for further management. In route, the patient received 2500 units of Kcentra. At bedside, the patient was intubated and received paralytics. Of sedation. Pupils were 4 mm bilaterally and nonreactive. Repeated CT head was done and showed Left frontotemporal lobar acute intraparenchymal hemorrhage,  with surrounding vasogenic edema. Extension of hemorrhage into the left midbrain with surrounding vasogenic edema. Extension of acute hemorrhage within the ventricular system involving left greater than right lateral ventricles, 3rd ventricle. CTA of the head and neck with spot sign indicating active bleeding. 1500 units of Kcentra, mannitol 150 g and 23.4% hypertonic saline 60 mL were ordered stat. Neurosurgery was consulted stat and recommended EVD. CBC and BMP within normal limits with sodium around 139.        ICH SCORE  (Estimate 30 Day Mortality)   POINTS   Age    < 80  ? 80     [x] 0  [] 1   GCS:    13-15                                 5-12                                   3-4         [] 0  [x] 1  [] 2   ICH Volume     < 30 mL (cc) ? 30 mL (cc)      [] 0  [x] 1   IVH      No                                     Yes      [] 0  [x] 1   Location    Supratentorial Origin                   Infratentorial Origin     [] 0  [x] 1   TOTAL 4   0= 10%             1=13%           2= 26%              3=72%             4 = 97%         5 & 6 = 100%    Initial CT head in the MSU      Repeated CTH        Admitted to ICU From: ED  Reason for ICU Admission: Intracranial bleed       PATIENT HISTORY   Past Medical History:    No past medical history on file. Past Surgical History:    No past surgical history on file. Social History:   Social History     Socioeconomic History    Marital status:      Spouse name: Not on file    Number of children: Not on file    Years of education: Not on file    Highest education level: Not on file   Occupational History    Not on file   Tobacco Use    Smoking status: Not on file   Substance and Sexual Activity    Alcohol use: Not on file    Drug use: Not on file    Sexual activity: Not on file   Other Topics Concern    Not on file   Social History Narrative    Not on file     Social Determinants of Health     Financial Resource Strain:     Difficulty of Paying Living Expenses:    Food Insecurity:     Worried About Running Out of Food in the Last Year:     [address] in the Last Year:    Transportation Needs:     Lack of Transportation (Medical):      Lack of Transportation (Non-Medical):    Physical Activity:     Days of Exercise per Week:     Minutes of Exercise per Session:    Stress:     Feeling of Stress :    Social Connections:     Frequency of Communication with Friends and Family:     Frequency of Social Gatherings with Friends and Family:     Attends Jewish Services:     Active Member of Clubs or Organizations:     Attends Club or Organization Meetings:     Marital Status:    Intimate Partner Violence:     Fear of Current or Ex-Partner:     Emotionally Abused:  Physically Abused:     Sexually Abused:        Family History:   No family history on file. Allergies:    Patient has no allergy information on record. Medications Prior to Admission:    No medications prior to admission.     Current Medications:  Current Facility-Administered Medications: levETIRAcetam (KEPPRA) 1000 mg/100 mL IVPB, 1,000 mg, Intravenous, Once  0.9 % sodium chloride infusion, 50 mL, Intravenous, Once  0.9 % sodium chloride infusion, 50 mL, Intravenous, Once  clevidipine (CLEVIPREX) infusion, 1-21 mg/hr, Intravenous, Continuous  labetalol (NORMODYNE;TRANDATE) injection 20 mg, 20 mg, Intravenous, Q4H PRN  hydrALAZINE (APRESOLINE) injection 10 mg, 10 mg, Intravenous, Q6H PRN  mannitol 20 % IVPB, , ,   acetaminophen (TYLENOL) tablet 650 mg, 650 mg, Oral, Q4H PRN  ondansetron (ZOFRAN-ODT) disintegrating tablet 4 mg, 4 mg, Oral, Q8H PRN **OR** ondansetron (ZOFRAN) injection 4 mg, 4 mg, Intravenous, Q6H PRN  atorvastatin (LIPITOR) tablet 20 mg, 20 mg, Oral, Nightly  levETIRAcetam (KEPPRA) 1000 mg/100 mL IVPB, 1,000 mg, Intravenous, Q12H  labetalol (NORMODYNE;TRANDATE) injection 10 mg, 10 mg, Intravenous, Q4H PRN  niCARdipine (CARDENE) 20 mg in 0.9 % sodium chloride 200 mL solution, 3-15 mg/hr, Intravenous, Continuous  fentaNYL 20 mcg/mL Infusion, 12.5-200 mcg/hr, Intravenous, Continuous  sodium chloride 3 % solution, 50 mL/hr, Intravenous, Continuous  famotidine (PEPCID) injection 20 mg, 20 mg, Intravenous, BID    REVIEW OF SYSTEMS     Intubated, unresponsive    PHYSICAL EXAM:     BP (!) 149/59   Pulse 81   Resp 19   SpO2 99%     There is no height or weight on file to calculate BMI.  []<16 Severe malnutrition  []16-16.99 Moderate malnutrition  []17-18.49 Mild malnutrition  []18.5-24.9 Normal  []25-29.9 Overweight (not obese)  []30-34.9 Obese class 1 (Low Risk)  []35-39.9 Obese class 2 (Moderate Risk)  []?40 Obese class 3 (High Risk)    PHYSICAL EXAM:  Intubated, responsive, the patient recently received paralytics  Pupils are 4 mm round and non reactive bilaterally, no corneals, no cough or gag reflex. The patient is not moving extremities to pain. We will examine the patient after 1 to 2 hours  LABS AND IMAGING:     RECENT LABS:  CBC with Differential:    Lab Results   Component Value Date    WBC 8.3 07/23/2021    RBC 4.82 07/23/2021    HGB 14.7 07/23/2021    HCT 44.4 07/23/2021     07/23/2021    MCV 92.1 07/23/2021    MCH 30.5 07/23/2021    MCHC 33.1 07/23/2021    RDW 13.2 07/23/2021     BMP:    Lab Results   Component Value Date     07/23/2021    K 3.5 07/23/2021     07/23/2021    CO2 21 07/23/2021    BUN 12 07/23/2021    CREATININE 0.47 07/23/2021    GFRAA NOT REPORTED 07/23/2021    LABGLOM NOT REPORTED 07/23/2021    GLUCOSE 122 07/23/2021       RADIOLOGY:   CT HEAD WO CONTRAST    Addendum Date: 7/23/2021    ADDENDUM: The left frontotemporal lobar acute intraparenchymal hemorrhage also extends into and involves the left thalamus. Result Date: 7/23/2021  EXAMINATION: CT OF THE HEAD WITHOUT CONTRAST  7/23/2021 12:21 am TECHNIQUE: CT of the head was performed without the administration of intravenous contrast. Dose modulation, iterative reconstruction, and/or weight based adjustment of the mA/kV was utilized to reduce the radiation dose to as low as reasonably achievable. COMPARISON: None. HISTORY: ORDERING SYSTEM PROVIDED HISTORY: trauma TECHNOLOGIST PROVIDED HISTORY: trauma Reason for Exam: trauma Acuity: Unknown Type of Exam: Unknown Mechanism of Injury: fall FINDINGS: BRAIN/VENTRICLES: Large lobar intraparenchymal acute hemorrhage is seen involving the left frontotemporal lobes which measures 7.0 cm anteroposterior by 3.8 cm transverse by 5.4 cm craniocaudad with extension of acute hemorrhage into the ventricular system involving the left greater than right lateral, 3rd ventricles.   Extension of hemorrhage into the left midbrain is also seen with surrounding vasogenic edema present. Mass effect related to in ventricular hemorrhage and the lobar hemorrhage causes rightward midline shift at the level of the septum pellucidum measuring 16 mm consistent with subfalcine herniation. Prominence of the lateral ventricles is seen suggesting CSF outflow obstruction. Multifocal hypoattenuation within cerebral white matter is seen consistent with moderate to severe microvascular ischemic disease. ORBITS: The visualized portion of the orbits demonstrate no acute abnormality. SINUSES: The visualized paranasal sinuses and mastoid air cells demonstrate no acute abnormality. SOFT TISSUES/SKULL:  No acute abnormality of the visualized skull or soft tissues. 1. Left frontotemporal lobar acute intraparenchymal hemorrhage, with dimensions as discussed above, with surrounding vasogenic edema. 2. Extension of hemorrhage into the left midbrain with surrounding vasogenic edema. 3. Extension of acute hemorrhage within the ventricular system involving left greater than right lateral ventricles, 3rd ventricle. 4. Suspected associated CSF outflow obstruction with hydrocephalus involving the bilateral lateral ventricles. 5. Mass effect causes rightward midline shift at the level of the septum pellucidum measuring 16 mm consistent with subfalcine herniation. 6. Recommend neurosurgical consultation. 7. Moderate to severe cerebral white matter microvascular ischemic disease. Critical results were called by [facility] Core team to Dr. Evert Grant On 7/23/2021 at 00:43. Dr. Evert Grant reportedly unavailable to speak with radiologist but reports he is aware of findings. XR CHEST PORTABLE    Result Date: 7/23/2021  EXAMINATION: ONE XRAY VIEW OF THE CHEST 7/23/2021 12:32 am COMPARISON: None.  HISTORY: ORDERING SYSTEM PROVIDED HISTORY: trauma TECHNOLOGIST PROVIDED HISTORY: trauma Reason for Exam: supine,et placement, poss stroke  fall Acuity: Unknown Type of Exam: Unknown FINDINGS: Enteric tube terminates 3 cm above the joel. Mile perihilar pulmonary vasculature and edema. Heart is mildly large in size. Enteric tube tip not visualized. However the enteric tube extends below the level of the hemidiaphragms. Minimal atelectasis     Satisfactory position endotracheal tube. Cardiomegaly with perihilar congestion and edema. CTA HEAD NECK W CONTRAST    Result Date: 7/23/2021  EXAMINATION: CTA OF THE HEAD AND NECK WITH CONTRAST 7/23/2021 12:24 am: TECHNIQUE: CTA of the head and neck was performed with the administration of intravenous contrast. Multiplanar reformatted images are provided for review. MIP images are provided for review. Stenosis of the internal carotid arteries measured using NASCET criteria. Dose modulation, iterative reconstruction, and/or weight based adjustment of the mA/kV was utilized to reduce the radiation dose to as low as reasonably achievable. COMPARISON: None. HISTORY: ORDERING SYSTEM PROVIDED HISTORY: Head bleed TECHNOLOGIST PROVIDED HISTORY: Head bleed Decision Support Exception - unselect if not a suspected or confirmed emergency medical condition->Emergency Medical Condition (MA) FINDINGS: CTA NECK: AORTIC ARCH/ARCH VESSELS: No dissection or arterial injury. No significant stenosis of the brachiocephalic or subclavian arteries. CAROTID ARTERIES: No dissection, arterial injury, or hemodynamically significant stenosis by NASCET criteria. VERTEBRAL ARTERIES: No dissection, arterial injury, or significant stenosis. SOFT TISSUES: The lung apices are clear. No cervical or superior mediastinal lymphadenopathy. The larynx and pharynx are unremarkable. No acute abnormality of the salivary and thyroid glands. BONES: No acute osseous abnormality. CTA HEAD: ANTERIOR CIRCULATION: No significant stenosis of the intracranial internal carotid, anterior cerebral, or middle cerebral arteries. No aneurysm. POSTERIOR CIRCULATION: No significant stenosis of the vertebral, basilar, or posterior cerebral arteries. No aneurysm. OTHER: No dural venous sinus thrombosis on this non-dedicated study. BRAIN: Redemonstration of the left frontotemporal and thalamic acute intraparenchymal hemorrhage with surrounding vasogenic edema is seen with extension into the ventricular system involving the left greater than right lateral ventricle and 3rd ventricles. Suspected CSF outflow obstruction persists with asymmetric enlargement of the bilateral lateral ventricles. Focal small blush of active extravasation is noted centrally within the posterior left frontal portion of the acute intraparenchymal hemorrhage measuring 7 mm in diameter. Focal small 7 mm diameter blush of active extravasation seen centrally within the left frontotemporal acute intraparenchymal hemorrhage within the posterior aspect of the frontal lobe. Finding concerning for rapidly expanding intraparenchymal acute hemorrhage collection. Unchanged extent of left frontotemporal and left thalamic acute intraparenchymal hemorrhage with intraventricular extension. Otherwise, unremarkable CT angiogram of the head and neck. CT CHEST ABDOMEN PELVIS W CONTRAST    Result Date: 7/23/2021  EXAMINATION: CT OF THE CHEST, ABDOMEN, AND PELVIS WITH CONTRAST 7/23/2021 12:30 am TECHNIQUE: CT of the chest, abdomen and pelvis was performed with the administration of intravenous contrast. Multiplanar reformatted images are provided for review. Dose modulation, iterative reconstruction, and/or weight based adjustment of the mA/kV was utilized to reduce the radiation dose to as low as reasonably achievable.  COMPARISON: None HISTORY: ORDERING SYSTEM PROVIDED HISTORY: trauma TECHNOLOGIST PROVIDED HISTORY: trauma Decision Support Exception - unselect if not a suspected or confirmed emergency medical condition->Emergency Medical Condition (MA) Reason for Exam: trauma Acuity: Unknown Type of Exam: Unknown Mechanism of Injury: fall Unwitnessed fall FINDINGS: Chest: Mediastinum:  No evidence of mediastinal hematoma or pneumomediastinum. No acute traumatic injury to the heart or pericardium. There is three-vessel coronary artery disease. The patient is intubated and a gastric tube is in place. No mediastinal adenopathy. Moderate atherosclerotic plaque. Lungs/pleura:  No acute traumatic injury to the lungs. No evidence of pulmonary contusion or laceration. No evidence of effusion or pneumothorax. Soft Tissues/Bones:  No acute displaced rib fracture or chest wall hematoma. The clavicles, scapulae and sternum appear intact. Remote instrumentation of the left humeral head. Please see separately dictated thoracic spine CT for findings in this region. Abdomen/Pelvis: Organs: No evidence of acute traumatic injury to the solid organs. There is excreted contrast in renal collecting systems. The liver, spleen, pancreas, kidneys and adrenal glands are without acute findings. The gallbladder is present without radiopaque cholelithiasis. There are bilateral parapelvic cysts. GI/Bowel: No mechanical bowel obstruction. No evidence of acute traumatic injury to the bowel. Pelvis: The urinary bladder contains a Novak catheter and excreted contrast. No evidence of bladder injury. No pelvic hematoma. The prostate and seminal vesicles are without acute findings. Peritoneum/Retroperitoneum: Moderate to severe atherosclerotic plaque. No hemoperitoneum or pneumoperitoneum. No mesenteric hematoma. Bones/Soft Tissues: No pelvic fracture. Please see separately dictated lumbar spine CT for findings in this region. No evidence of acute traumatic injury to the chest, abdomen or pelvis. Three-vessel coronary artery disease and moderate to severe atherosclerotic plaque. Labs and Images reviewed with:    [] Brianna Mosquera MD    [] Patrica Espinoza MD  [] Christos Lopez MD  --[] there are no new interval images to review.      ASSESSMENT AND PLAN:         ASSESSMENT:     ibrillation on Eliquis presented with mobile stroke unit with right hemiplegia and complete expressive aphasia. Was found to have right thalamic bleed with extension to the midbrain bleed and IVH. Discussed with Dr Madeline Cespedes:        NEUROLOGIC:  - Imaging initial CT head with right thalamic bleed and IVH. Repeated CT head with extension of the right thalamic bleed, midline shift 16 mm, extension of the IVH with early signs of hydro-, midbrain bleed. CTA with spot sign.  - AEDs loaded with 3 g of Keppra, continue 1 g twice daily as maintenance dose.  - LTME  - Goal SBP < 140, on Clividipine drip  - Mannitol 150 g once, bolused of 23.4% hypertonic saline 60 mL. On 50 cc 3% hypertonic saline.   - Repeat CT head in 6 to 8 hours. - EVD placed. - Neuro checks per protocol    DVT PROPHYLAXIS:  - SCD sleeves - Thigh High   - TRUDY stockings - Thigh High  - No chemoprophylaxis anticoagulation at this time.           [address] Yandy Francisco MD  Neuro Critical Care Service   7/23/2021     4:37 AM

## 2021-07-23 NOTE — PROGRESS NOTES
Significant event        - Blood pressure elevated,   - on exam:   Pupillometer b/l non reactive    Cornea+, cough+   - Received bolus of mannitol  120 gm    -23.4 % Nacl 60 ml   - 240 meq of Nacl          Plan:  -CT head: Which showed worsening of bleeding  -Sodium goal 150  -EVD at 20 of water  -SBP goal of less than 160  -Echo with bubble  -CCP greater than 70  -ICP 8-9 currently  -Trend troponin  -Full support  -No DVT prophylaxis:  -3% sodium chloride to maintain sodium goal of 150  -Repeat sodium check after stroke bolus  -Serum osmolarity check  -Patient started on oral labetalol for blood pressure control which can be later on titrated down.  -Blood glucose level in between 120-180

## 2021-07-23 NOTE — H&P
TRAUMA HISTORY AND PHYSICAL EXAMINATION    PATIENT NAME: Xxriverside Trauma B  YOB: 1879  MEDICAL RECORD NO. [id number]   DATE: 7/23/2021  PRIMARY CARE PHYSICIAN: No primary care provider on file. PATIENT EVALUATED AT THE REQUEST OF : Willi Valencia     ACTIVATION   [x]Trauma Alert     [] Trauma Priority     []Trauma Consult. IMPRESSION:     intraparenchymal acute hemorrhage     MEDICAL DECISION MAKING AND PLAN:   intraparenchymal acute hemorrhage   - completion scans  - sign off patient to Neuro critical care       Hair domi     [] Neurosurgery     [] Orthopedic Surgery    [] Cardiothoracic     [] Facial Trauma    [] Plastic Surgery (Burn)    [] Pediatric Surgery     [] Internal Medicine    [] Pulmonary Medicine         HISTORY:     Chief Complaint:  \"found down in bathroom\"     INJURY SUMMAR  Intraparenchymal acute hemorrhage     If intracranial hemorrhage is present, is it a BIG 1 category: [] YES  [x]NO    GENERAL DATA  Age 81 y.o.  male   Patient information was obtained from EMS personnel. History/Exam limitations: due to condition. Patient presented to the Emergency Department by ambulance where the patient received see Ambulance Run Sheet prior to arrival.  Injury Date: 7/23/2021   Approximate Injury Time: 2330       Transport mode:   []Ambulance      [] Helicopter     []Car       [x] Other Mobile stroke unit       INJURY LOCATION, (e.g., home, farm, industry, street)  Specific Details of Location (e.g., bedroom, kitchen, garage): bathroom   Type of Residence (if occurred in home setting) (e.g., apartment, mobile home, single family home): in patients home   MECHANISM OF INJURY    Found down in bathroom         HISTORY:     Rosemary FAJARDO is a 81 y.o. male that presented to the Emergency Department as a trauma alert   for altered mental status after fall. Per report of patient family  they heard a thump in the bathroom and patient was found down unresponsive.   EMS was called and on arrival patient remained unresponsive, a nasal trumpet was placed, and patient maintained normal oxygen saturation on 3 L nasal cannula. GCS was 3. Patient with intact gag reflex but otherwise totally unresponsive to painful stimuli. Patient was brought in by mobile stroke unit and CT scan by them revealed large intracranial bleed. Patient received 2500 units total Kcentra prior to arrival per the stroke attending on call. Loss of Consciousness []No   [x]Yes Duration(min) unknown         MEDICATIONS:   []  None     [x]  Information not available due to exam limitations documented above      ALLERGIES:   []  None    []   Information not available due to exam limitations documented above   Patient has no allergy information on record. PAST MEDICAL HISTORY: []  None   []   Information not available due to exam limitations documented above    has no past medical history on file. has no past surgical history on file. FAMILY HISTORY   []   Information not available due to exam limitations documented above    family history is not on file. SOCIAL HISTORY  []   Information not available due to exam limitations documented above     has no history on file for tobacco use.   has no history on file for alcohol use.   has no history on file for drug use.     PERTINENT SYSTEMIC REVIEW:    [x]   Information not available due to exam limitations documented above        PHYSICAL EXAMINATION:     [address] TO ARRIVAL     [x]No        []Yes      Variable  Score   Variable  Score  Eye opening []Spontaneous 4 Verbal  []Oriented  5     []To voice  3   []Confused  4    []To pain  2   []Inapp words  3    [x]None  1   []Incomp words 2       [x]None  1   Motor   []Obeys  6    []Localizes pain 5    []Withdraws(pain) 4    []Flexion(pain) 3  []Extension(pain) 2    [x]None  1     GCS Total = 3 T     PHYSICAL EXAMINATION    VITAL SIGNS:   180/76, 84, NRB 96%, RR 20       Physical Exam  Constitutional:       Appearance: He is ill-appearing. HENT:      Head: Normocephalic. Right Ear: Tympanic membrane and external ear normal.      Left Ear: Tympanic membrane and external ear normal.      Nose: Nose normal.      Mouth/Throat:      Mouth: Mucous membranes are moist.      Pharynx: Oropharynx is clear. Eyes:      Conjunctiva/sclera: Conjunctivae normal.      Comments: Left pupil 5mm, right pupil 2 mm. Both pupils are reactive to light but sluggish    Cardiovascular:      Rate and Rhythm: Normal rate and regular rhythm. Pulses: Normal pulses. Heart sounds: Normal heart sounds. Pulmonary:      Effort: Pulmonary effort is normal.      Breath sounds: Normal breath sounds. Abdominal:      General: Bowel sounds are normal.      Palpations: Abdomen is soft. Musculoskeletal:      Cervical back: Normal range of motion and neck supple. Comments: Abrasion to left lower shin    Skin:     General: Skin is warm and dry. Capillary Refill: Capillary refill takes less than 2 seconds. Comments: Abrasion to left lower shin, abrasion to right frontal scalp    Neurological:      Comments: Patient 3T, intubated and sedated          FOCUSED ABDOMINAL SONOGRAM FOR TRAUMA (FAST): A good quality examination was performed by Dr. Mendoza Bustos and representative images were obtained.     [x] No free fluid in the abdomen and pelvis  [] Free fluid in RUQ   [] Free fluid in LUQ  [] Free fluid in Pelvis  [] Pericardial fluid  [] Other:        RADIOLOGY  CT CERVICAL SPINE WO CONTRAST    (Results Pending)   CT CHEST ABDOMEN PELVIS W CONTRAST    (Results Pending)   CT LUMBAR SPINE TRAUMA RECONSTRUCTION    (Results Pending)   CT THORACIC SPINE TRAUMA RECONSTRUCTION    (Results Pending)   XR CHEST PORTABLE    (Results Pending)   CT HEAD WO CONTRAST    (Results Pending)         LABS    Labs Reviewed   URINE DRUG Annalisa Cooper MD  7/23/21, 12:10 AM     Attending Note      I have reviewed the above GCS note(s) and I either performed the key elements of the medical history and physical exam or was present with the trauma resident when the key elements of the medical history and physical exam were performed. I have discussed the findings, established the care plan and recommendations with the trauma team.  Seen and examined on arrival to ED. Immediately intubated, unable to obtain any info from patient. Scans reviewed and large intracerebral bleed noted with active extravasation on CTA. Non traumatic bleed. Will sign off.   Management per neuro critical care    Matt Smith MD  7/23/2021  7:19 AM

## 2021-07-23 NOTE — PLAN OF CARE
Problem: MECHANICAL VENTILATION  Goal: Patient will maintain patent airway  7/23/2021 1757 by Jeison Garcia RN  Outcome: Completed  Patient terminally extubated  Problem: SKIN INTEGRITY  Goal: Skin integrity is maintained or improved  7/23/2021 1757 by Jeison Garcia RN  Outcome: Met This Shift  Skin assessed for breakdown and redness, patient turned regularly, and heels elevated off of bed on pillows.

## 2021-07-23 NOTE — PROGRESS NOTES
Patient extubated per physician order. Patient extubated in usual fashion. Patient placed on  room air.      Magy Giraldo RCP   4:44 PM

## 2021-07-23 NOTE — ED PROVIDER NOTES
[facility]  ED  Emergency Department Encounter  EmergencyMedicine Resident     Pt Name:Xxriverside Trauma B  MRN: [id number]  Terigfmargarita 1955  Date of evaluation: 7/23/21  PCP:  Dottie Benavides MD    CHIEF COMPLAINT       Chief Complaint   Patient presents with    Cerebrovascular Accident       HISTORY OF PRESENT ILLNESS  (Location/Symptom, Timing/Onset, Context/Setting, Quality, Duration, Modifying Factors, Severity.)      Xxriverside Trauma B is a 73 y.o. male who presents as a trauma alert for altered mental status after fall. Patient was reportedly at home earlier today when family members heard a thump in the bathroom and found patient down, unresponsive. Patient arrives by EMS unresponsive, nasal trumpet in place, normal oxygen saturation on 3 L nasal cannula. GCS was 3. Patient with intact gag reflex but otherwise totally unresponsive to painful stimuli. Patient was brought in by mobile stroke unit and CT scan by them revealed large intracranial bleed. Patient received 2500 units total Kcentra prior to arrival per the stroke attending on call.     PAST MEDICAL / SURGICAL / SOCIAL / FAMILY HISTORY     Unknown past medical and surgical history    Social History     Socioeconomic History    Marital status:      Spouse name: Not on file    Number of children: Not on file    Years of education: Not on file    Highest education level: Not on file   Occupational History    Not on file   Tobacco Use    Smoking status: Not on file   Substance and Sexual Activity    Alcohol use: Not on file    Drug use: Not on file    Sexual activity: Not on file   Other Topics Concern    Not on file   Social History Narrative    Not on file     Social Determinants of Health     Financial Resource Strain:     Difficulty of Paying Living Expenses:    Food Insecurity:     Worried About Running Out of Food in the Last Year:     [address] in the Last Year:    Transportation Needs:  Lack of Transportation (Medical):  Lack of Transportation (Non-Medical):    Physical Activity:     Days of Exercise per Week:     Minutes of Exercise per Session:    Stress:     Feeling of Stress :    Social Connections:     Frequency of Communication with Friends and Family:     Frequency of Social Gatherings with Friends and Family:     Attends Zoroastrianism Services:     Active Member of Clubs or Organizations:     Attends Club or Organization Meetings:     Marital Status:    Intimate Partner Violence:     Fear of Current or Ex-Partner:     Emotionally Abused:     Physically Abused:     Sexually Abused:        No family history on file. Allergies:  Patient has no allergy information on record. Home Medications:  Prior to Admission medications    Not on File       REVIEW OF SYSTEMS    (2-9 systems for level 4, 10 or more for level 5)      Review of Systems   Unable to perform ROS: Patient unresponsive       PHYSICAL EXAM   (up to 7 for level 4, 8 or more for level 5)      INITIAL VITALS:   BP (!) 142/54   Pulse 84   Resp 17   SpO2 99%     Physical Exam  Constitutional:       General: He is in acute distress. Appearance: He is ill-appearing. HENT:      Head: Normocephalic. Comments: Abrasion over scalp       Mouth/Throat:      Mouth: Mucous membranes are moist.      Comments: Vomitus present in posterior oropharynx  Eyes:      Comments: Pupil 5 mm, right pupil 2 mm, unequal, sluggishly reactive to light   Cardiovascular:      Rate and Rhythm: Normal rate. Heart sounds: No murmur heard. No friction rub. No gallop. Pulmonary:      Breath sounds: No wheezing, rhonchi or rales. Comments: Intubated with 7.5 ET tube, 26 cm at the teeth, equal breath sounds bilaterally  Abdominal:      General: There is no distension. Palpations: There is no mass. Musculoskeletal:         General: No signs of injury. Right lower leg: No edema. Left lower leg: No edema. Skin:     Findings: No bruising, lesion or rash. Neurological:      Mental Status: He is unresponsive. GCS: GCS eye subscore is 1. GCS verbal subscore is 1. GCS motor subscore is 1. Comments: GCS 3, intact gag reflex, not opening eyes, no verbal response, no response to painful stimuli         DIFFERENTIAL  DIAGNOSIS     PLAN (LABS / IMAGING / EKG):  Orders Placed This Encounter   Procedures    COVID-19, Rapid    CT CERVICAL SPINE WO CONTRAST    CT CHEST ABDOMEN PELVIS W CONTRAST    CT LUMBAR SPINE TRAUMA RECONSTRUCTION    CT THORACIC SPINE TRAUMA RECONSTRUCTION    XR CHEST PORTABLE    CT HEAD WO CONTRAST    CTA HEAD NECK W CONTRAST    MRI brain with and without contrast    XR CHEST PORTABLE    DRUG SCREEN MULTI URINE    URINALYSIS    Trauma Panel    Urine Drug Screen    Urinalysis    Blood gas, arterial    CBC    Basic Metabolic Panel    Sodium Lab    Microscopic Urinalysis    CBC    Hemoglobin A1c    Lipid panel - fasting    Basic metabolic panel    Troponin    DRUG SCREEN MULTI URINE    SODIUM    BLOOD GAS, ARTERIAL    Diet NPO    Notify ordering physician while on Hypertonic Saline    Vital signs    Notify Physician    Notify physician    Neuro checks    Gerald coma scale    NIHSS    Elevate Head of Bed     Swallow screen by nursing before diet and oral medications started.  Stroke education    Place intermittent pneumatic compression device    Telemetry monitoring - 48 hour duration    Strict Bedrest    Telemetry EKG instruction    Misc nursing order (specify)    Misc nursing order (specify)    Full Code    Pharmacy to Dose: Other - See Comments: The pharmacist will review this patient's medication profile to evaluate IV medications and change all base solutions to 0.9% sodium chloride if possible based on compatibility and product availability. This. .. [address] [place] [address] to change base solutions.  Pharmacy to Dose: Other - See Comments:  The  acetaminophen (TYLENOL) tablet 650 mg    OR Linked Order Group     ondansetron (ZOFRAN-ODT) disintegrating tablet 4 mg     ondansetron (ZOFRAN) injection 4 mg    atorvastatin (LIPITOR) tablet 20 mg    levETIRAcetam (KEPPRA) 1000 mg/100 mL IVPB    labetalol (NORMODYNE;TRANDATE) injection 10 mg    niCARdipine (CARDENE) 20 mg in 0.9 % sodium chloride 200 mL solution    fentaNYL 20 mcg/mL Infusion       DDX: Subdural hematoma, epidural hematoma, parenchymal hemorrhage, cervical spine fracture    DIAGNOSTIC RESULTS / EMERGENCY DEPARTMENT COURSE / MDM   LAB RESULTS:  Results for orders placed or performed during the hospital encounter of 07/22/21   Trauma Panel   Result Value Ref Range    Ethanol <10 <10 mg/dL    Ethanol percent <0.010 <0.010 %    Blood Bank Specimen BILL FOR SERVICES PERFORMED     BUN 12 8 - 23 mg/dL    WBC 8.3 3.5 - 11.3 k/uL    RBC 4.82 4.21 - 5.77 m/uL    Hemoglobin 14.7 13.0 - 17.0 g/dL    Hematocrit 44.4 40.7 - 50.3 %    MCV 92.1 82.6 - 102.9 fL    MCH 30.5 25.2 - 33.5 pg    MCHC 33.1 28.4 - 34.8 g/dL    RDW 13.2 11.8 - 14.4 %    Platelets 146 069 - 479 k/uL    MPV 11.3 8.1 - 13.5 fL    NRBC Automated 0.0 0.0 per 100 WBC    CREATININE 0.47 (L) 0.70 - 1.20 mg/dL    GFR Non- NOT REPORTED >60 mL/min    GFR  NOT REPORTED >60 mL/min    GFR Comment NOT REPORTED     GFR Staging NOT REPORTED     Glucose 122 (H) 70 - 99 mg/dL    hCG Qual CANCEL PT MALE NEGATIVE    Sodium 139 135 - 144 mmol/L    Potassium 3.5 (L) 3.7 - 5.3 mmol/L    Chloride 100 98 - 107 mmol/L    CO2 21 20 - 31 mmol/L    Anion Gap 18 (H) 9 - 17 mmol/L    Protime 10.3 9.1 - 12.3 sec    INR 1.0     PTT 24.3 20.5 - 30.5 sec    pH, Bryant 7.378 7.320 - 7.420    pCO2, Bryant 39.5 39 - 55    pO2, Bryant 87.8 (H) 30 - 50    HCO3, Venous 22.7 (L) 24 - 30 mmol/L    Positive Base Excess, Bryant NOT REPORTED 0.0 - 2.0 mmol/L    Negative Base Excess, Bryant 1.7 0.0 - 2.0 mmol/L    O2 Sat, Bryant 96.4 (H) 60.0 - 85.0 %    Total Hb NOT REPORTED 12.0 - 16.0 g/dl    Oxyhemoglobin NOT REPORTED 95.0 - 98.0 %    Carboxyhemoglobin 1.0 0 - 5 %    Methemoglobin NOT REPORTED 0.0 - 1.5 %    Pt Temp 37.0     pH, Bryant, Temp Adj NOT REPORTED 7.320 - 7.420    pCO2, Bryant, Temp Adj NOT REPORTED 39 - 55 mmHg    pO2, Bryant, Temp Adj NOT REPORTED 30 - 50 mmHg    O2 Device/Flow/% NOT REPORTED     Respiratory Rate NOT REPORTED     Karl Test NOT REPORTED     Sample Site NOT REPORTED     Pt. Position NOT REPORTED     Mode NOT REPORTED     Set Rate NOT REPORTED     Total Rate NOT REPORTED     VT NOT REPORTED     FIO2 INFORMATION NOT PROVIDED     Peep/Cpap NOT REPORTED     PSV NOT REPORTED     Text for Respiratory NOT REPORTED     NOTIFICATION NOT REPORTED     NOTIFICATION TIME NOT REPORTED    Urine Drug Screen   Result Value Ref Range    Amphetamine Screen, Ur NEGATIVE NEGATIVE    Barbiturate Screen, Ur NEGATIVE NEGATIVE    Benzodiazepine Screen, Urine NEGATIVE NEGATIVE    Cocaine Metabolite, Urine NEGATIVE NEGATIVE    Methadone Screen, Urine NEGATIVE NEGATIVE    Opiates, Urine NEGATIVE NEGATIVE    Phencyclidine, Urine NEGATIVE NEGATIVE    Propoxyphene, Urine NOT REPORTED NEGATIVE    Cannabinoid Scrn, Ur POSITIVE (A) NEGATIVE    Oxycodone Screen, Ur NEGATIVE NEGATIVE    Methamphetamine, Urine NOT REPORTED NEGATIVE    Tricyclic Antidepressants, Urine NOT REPORTED NEGATIVE    MDMA, Urine NOT REPORTED NEGATIVE    Buprenorphine Urine NOT REPORTED NEGATIVE    Test Information       Assay provides medical screening only. The absence of expected drug(s) and/or metabolite(s) may indicate diluted or adulterated urine, limitations of testing or timing of collection.    Urinalysis   Result Value Ref Range    Color, UA YELLOW YELLOW    Turbidity UA CLEAR CLEAR    Glucose, Ur NEGATIVE NEGATIVE    Bilirubin Urine NEGATIVE NEGATIVE    Ketones, Urine TRACE (A) NEGATIVE    Specific Gravity, UA 1.011 1.005 - 1.030    Urine Hgb LARGE (A) NEGATIVE    pH, UA 8.0 5.0 - 8.0    Protein, UA 1+ (A) NEGATIVE    Urobilinogen, Urine Normal Normal    Nitrite, Urine NEGATIVE NEGATIVE    Leukocyte Esterase, Urine NEGATIVE NEGATIVE    Urinalysis Comments NOT REPORTED    Microscopic Urinalysis   Result Value Ref Range    -          WBC, UA 0 TO 2 0 - 5 /HPF    RBC, UA TOO NUMEROUS TO COUNT 0 - 4 /HPF    Casts UA  0 - 8 /LPF     0 TO 2 HYALINE Reference range defined for non-centrifuged specimen. Crystals, UA NOT REPORTED None /HPF    Epithelial Cells UA 0 TO 2 0 - 5 /HPF    Renal Epithelial, UA NOT REPORTED 0 /HPF    Bacteria, UA NOT REPORTED None    Mucus, UA NOT REPORTED None    Trichomonas, UA NOT REPORTED None    Amorphous, UA NOT REPORTED None    Other Observations UA NOT REPORTED NOT REQ. Yeast, UA NOT REPORTED None   Arterial Blood Gas, POC   Result Value Ref Range    POC pH 7.381 7.350 - 7.450    POC pCO2 43.8 35.0 - 48.0 mm Hg    POC PO2 373.7 (H) 83.0 - 108.0 mm Hg    POC HCO3 26.0 21.0 - 28.0 mmol/L    TCO2 (calc), Art NOT REPORTED 22.0 - 29.0 mmol/L    Negative Base Excess, Art NOT REPORTED 0.0 - 2.0    Positive Base Excess, Art 0 0.0 - 3.0    POC O2  (H) 94.0 - 98.0 %    O2 Device/Flow/% Adult Ventilator     Karl Test NOT REPORTED     Sample Site Right Radial Artery     Mode PRVC     FIO2 100.0     Pt Temp NOT REPORTED     POC pH Temp NOT REPORTED     POC pCO2 Temp NOT REPORTED mm Hg    POC pO2 Temp NOT REPORTED mm Hg   TYPE AND SCREEN   Result Value Ref Range    Expiration Date 07/26/2021,2359     Arm Band Number [id number]     ABO/Rh O NEGATIVE     Antibody Screen NEGATIVE        IMPRESSION: Acutely ill, unresponsive elderly male presenting by mobile stroke unit for altered mental status after fall. Scanned in mobile stroke unit positive for intracranial bleed, on Eliquis. Patient received 2500 units Kcentra prior to arrival, additional 1500 units ordered here.   On arrival to trauma bay patient with GCS of 3, vomitus surrounding face and upper chest.  Patient not maintaining the administration of intravenous contrast. Multiplanar reformatted images are provided for review. Dose modulation, iterative reconstruction, and/or weight based adjustment of the mA/kV was utilized to reduce the radiation dose to as low as reasonably achievable.; CT of the lumbar spine was performed without the administration of intravenous contrast. Multiplanar reformatted images are provided for review. Dose modulation, iterative reconstruction, and/or weight based adjustment of the mA/kV was utilized to reduce the radiation dose to as low as reasonably achievable.; CT of the thoracic spine was performed without the administration of intravenous contrast. Multiplanar reformatted images are provided for review. Dose modulation, iterative reconstruction, and/or weight based adjustment of the mA/kV was utilized to reduce the radiation dose to as low as reasonably achievable. COMPARISON: None. HISTORY: ORDERING SYSTEM PROVIDED HISTORY: trauma TECHNOLOGIST PROVIDED HISTORY: trauma Decision Support Exception - unselect if not a suspected or confirmed emergency medical condition->Emergency Medical Condition (MA) Reason for Exam: trauma Acuity: Unknown Type of Exam: Unknown Mechanism of Injury: fall FINDINGS: BONES/ALIGNMENT: There is no acute fracture or traumatic malalignment. Mild dextrocurvature of the lumbar spine centered at level L3 is present. DEGENERATIVE CHANGES: Cervical spine: C3/C4: Moderate disc height loss is present in association with posterior disc osteophyte complex which narrows the midline AP thecal sac diameter to 10 mm consistent with borderline central canal stenosis. Severe right and moderate left neural foraminal stenosis secondary to encroachment by disc osteophyte complex is noted.  C5/C6: Moderate disc height loss is present in association with posterior disc osteophyte complex which indents the ventral thecal sac narrowing the midline AP thecal sac diameter to 10 mm consistent with borderline central canal stenosis. Disc osteophyte complex encroaches upon and causes severe bilateral neural foraminal stenosis. C6/C7: Moderate disc height loss is noted at this level in association with posterior disc osteophyte complex which indents the ventral thecal sac narrowing the midline AP thecal sac diameter to 10 mm consistent with borderline central canal stenosis. Disc osteophyte complex encroaches upon and causes moderate bilateral neural foraminal stenosis. Thoracic spine: T9/T10 and T10/T11 borderline central canal stenosis secondary to encroachment by posterior disc osteophyte complex is noted. Neural foramina appear patent. Lumbar spine: L2/L3: Mild disc height loss is noted with vacuum disc phenomenon in association with posterior disc osteophyte complex which indents the ventral thecal sac narrowing the midline AP thecal sac diameter to 8 mm consistent with moderate central canal stenosis. Disc osteophyte complex encroaches upon and causes severe left neural foraminal narrowing. The right neural foramen is patent. L3/L4: Mild disc height loss, vacuum disc phenomenon is noted in association with posterior disc osteophyte complex which narrows the midline AP thecal sac diameter to 8 mm consistent with moderate central canal stenosis. Disc osteophyte complex encroaches upon and causes moderate bilateral neural foraminal stenosis. L4/L5: Severe disc height loss, vacuum disc phenomenon is noted in association with posterior disc osteophyte complex which narrows the midline AP thecal sac diameter to 10 mm consistent with borderline central canal stenosis. Disc osteophyte complex encroaches upon and causes severe right and moderate left neural foraminal stenosis.  L5/S1: Moderate disc height loss is noted in association with posterior disc osteophyte complex which indents the ventral thecal sac narrowing the midline AP thecal sac diameter to 10 mm consistent with borderline central canal stenosis. Disc osteophyte complex encroaches upon and causes severe bilateral neural foraminal stenosis. SOFT TISSUES: There is no prevertebral soft tissue swelling. 1. No acute abnormality of the cervical, thoracic or lumbar spine. 2. C3/C4 borderline, C5/C6 borderline, C6/C7 borderline, T9/T10 borderline, T10/T11 borderline, L2/L3 moderate, L3/L4 moderate, L4/L5 borderline and L5/S1 borderline central canal stenosis secondary to encroachment by posterior disc osteophyte complex. 3. C3/C4 severe right and moderate left, C5/C6 severe bilateral, C6/C7 moderate bilateral, L2/L3 severe left, L3/L4 moderate bilateral, L4/L5 severe right and moderate left, L5/S1 severe bilateral neural foraminal stenosis secondary to encroachment by disc osteophyte complex. 4. Mild lumbar spine dextroscoliosis. 5. Note: Study limited by patient motion related artifact, particularly imaging of the cervical spine. XR CHEST PORTABLE    Result Date: 7/23/2021  EXAMINATION: ONE XRAY VIEW OF THE CHEST 7/23/2021 12:32 am COMPARISON: None. HISTORY: ORDERING SYSTEM PROVIDED HISTORY: trauma TECHNOLOGIST PROVIDED HISTORY: trauma Reason for Exam: supine,et placement, poss stroke  fall Acuity: Unknown Type of Exam: Unknown FINDINGS: Enteric tube terminates 3 cm above the joel. Mile perihilar pulmonary vasculature and edema. Heart is mildly large in size. Enteric tube tip not visualized. However the enteric tube extends below the level of the hemidiaphragms. Minimal atelectasis     Satisfactory position endotracheal tube. Cardiomegaly with perihilar congestion and edema. CTA HEAD NECK W CONTRAST    Result Date: 7/23/2021  EXAMINATION: CTA OF THE HEAD AND NECK WITH CONTRAST 7/23/2021 12:24 am: TECHNIQUE: CTA of the head and neck was performed with the administration of intravenous contrast. Multiplanar reformatted images are provided for review. MIP images are provided for review.  Stenosis of the internal carotid arteries measured using NASCET criteria. Dose modulation, iterative reconstruction, and/or weight based adjustment of the mA/kV was utilized to reduce the radiation dose to as low as reasonably achievable. COMPARISON: None. HISTORY: ORDERING SYSTEM PROVIDED HISTORY: Head bleed TECHNOLOGIST PROVIDED HISTORY: Head bleed Decision Support Exception - unselect if not a suspected or confirmed emergency medical condition->Emergency Medical Condition (MA) FINDINGS: CTA NECK: AORTIC ARCH/ARCH VESSELS: No dissection or arterial injury. No significant stenosis of the brachiocephalic or subclavian arteries. CAROTID ARTERIES: No dissection, arterial injury, or hemodynamically significant stenosis by NASCET criteria. VERTEBRAL ARTERIES: No dissection, arterial injury, or significant stenosis. SOFT TISSUES: The lung apices are clear. No cervical or superior mediastinal lymphadenopathy. The larynx and pharynx are unremarkable. No acute abnormality of the salivary and thyroid glands. BONES: No acute osseous abnormality. CTA HEAD: ANTERIOR CIRCULATION: No significant stenosis of the intracranial internal carotid, anterior cerebral, or middle cerebral arteries. No aneurysm. POSTERIOR CIRCULATION: No significant stenosis of the vertebral, basilar, or posterior cerebral arteries. No aneurysm. OTHER: No dural venous sinus thrombosis on this non-dedicated study. BRAIN: Redemonstration of the left frontotemporal and thalamic acute intraparenchymal hemorrhage with surrounding vasogenic edema is seen with extension into the ventricular system involving the left greater than right lateral ventricle and 3rd ventricles. Suspected CSF outflow obstruction persists with asymmetric enlargement of the bilateral lateral ventricles. Focal small blush of active extravasation is noted centrally within the posterior left frontal portion of the acute intraparenchymal hemorrhage measuring 7 mm in diameter.      Focal small 7 mm diameter blush of active extravasation seen centrally within the left frontotemporal acute intraparenchymal hemorrhage within the posterior aspect of the frontal lobe. Finding concerning for rapidly expanding intraparenchymal acute hemorrhage collection. Unchanged extent of left frontotemporal and left thalamic acute intraparenchymal hemorrhage with intraventricular extension. Otherwise, unremarkable CT angiogram of the head and neck. CT CHEST ABDOMEN PELVIS W CONTRAST    Result Date: 7/23/2021  EXAMINATION: CT OF THE CHEST, ABDOMEN, AND PELVIS WITH CONTRAST 7/23/2021 12:30 am TECHNIQUE: CT of the chest, abdomen and pelvis was performed with the administration of intravenous contrast. Multiplanar reformatted images are provided for review. Dose modulation, iterative reconstruction, and/or weight based adjustment of the mA/kV was utilized to reduce the radiation dose to as low as reasonably achievable. COMPARISON: None HISTORY: ORDERING SYSTEM PROVIDED HISTORY: trauma TECHNOLOGIST PROVIDED HISTORY: trauma Decision Support Exception - unselect if not a suspected or confirmed emergency medical condition->Emergency Medical Condition (MA) Reason for Exam: trauma Acuity: Unknown Type of Exam: Unknown Mechanism of Injury: fall Unwitnessed fall FINDINGS: Chest: Mediastinum:  No evidence of mediastinal hematoma or pneumomediastinum. No acute traumatic injury to the heart or pericardium. There is three-vessel coronary artery disease. The patient is intubated and a gastric tube is in place. No mediastinal adenopathy. Moderate atherosclerotic plaque. Lungs/pleura:  No acute traumatic injury to the lungs. No evidence of pulmonary contusion or laceration. No evidence of effusion or pneumothorax. Soft Tissues/Bones:  No acute displaced rib fracture or chest wall hematoma. The clavicles, scapulae and sternum appear intact. Remote instrumentation of the left humeral head. Please see separately dictated thoracic spine CT for findings in this region. Abdomen/Pelvis: Organs: No evidence of acute traumatic injury to the solid organs. There is excreted contrast in renal collecting systems. The liver, spleen, pancreas, kidneys and adrenal glands are without acute findings. The gallbladder is present without radiopaque cholelithiasis. There are bilateral parapelvic cysts. GI/Bowel: No mechanical bowel obstruction. No evidence of acute traumatic injury to the bowel. Pelvis: The urinary bladder contains a Novak catheter and excreted contrast. No evidence of bladder injury. No pelvic hematoma. The prostate and seminal vesicles are without acute findings. Peritoneum/Retroperitoneum: Moderate to severe atherosclerotic plaque. No hemoperitoneum or pneumoperitoneum. No mesenteric hematoma. Bones/Soft Tissues: No pelvic fracture. Please see separately dictated lumbar spine CT for findings in this region. No evidence of acute traumatic injury to the chest, abdomen or pelvis. Three-vessel coronary artery disease and moderate to severe atherosclerotic plaque. CT LUMBAR SPINE TRAUMA RECONSTRUCTION    Result Date: 7/23/2021  EXAMINATION: CT OF THE CERVICAL SPINE WITHOUT CONTRAST; CT OF THE LUMBAR SPINE WITHOUT CONTRAST; CT OF THE THORACIC SPINE WITHOUT CONTRAST 7/23/2021 12:21 am TECHNIQUE: CT of the cervical spine was performed without the administration of intravenous contrast. Multiplanar reformatted images are provided for review. Dose modulation, iterative reconstruction, and/or weight based adjustment of the mA/kV was utilized to reduce the radiation dose to as low as reasonably achievable.; CT of the lumbar spine was performed without the administration of intravenous contrast. Multiplanar reformatted images are provided for review.  Dose modulation, iterative reconstruction, and/or weight based adjustment of the mA/kV was utilized to reduce the radiation dose to as low as reasonably achievable.; CT of the thoracic spine was performed without the administration of intravenous contrast. Multiplanar reformatted images are provided for review. Dose modulation, iterative reconstruction, and/or weight based adjustment of the mA/kV was utilized to reduce the radiation dose to as low as reasonably achievable. COMPARISON: None. HISTORY: ORDERING SYSTEM PROVIDED HISTORY: trauma TECHNOLOGIST PROVIDED HISTORY: trauma Decision Support Exception - unselect if not a suspected or confirmed emergency medical condition->Emergency Medical Condition (MA) Reason for Exam: trauma Acuity: Unknown Type of Exam: Unknown Mechanism of Injury: fall FINDINGS: BONES/ALIGNMENT: There is no acute fracture or traumatic malalignment. Mild dextrocurvature of the lumbar spine centered at level L3 is present. DEGENERATIVE CHANGES: Cervical spine: C3/C4: Moderate disc height loss is present in association with posterior disc osteophyte complex which narrows the midline AP thecal sac diameter to 10 mm consistent with borderline central canal stenosis. Severe right and moderate left neural foraminal stenosis secondary to encroachment by disc osteophyte complex is noted. C5/C6: Moderate disc height loss is present in association with posterior disc osteophyte complex which indents the ventral thecal sac narrowing the midline AP thecal sac diameter to 10 mm consistent with borderline central canal stenosis. Disc osteophyte complex encroaches upon and causes severe bilateral neural foraminal stenosis. C6/C7: Moderate disc height loss is noted at this level in association with posterior disc osteophyte complex which indents the ventral thecal sac narrowing the midline AP thecal sac diameter to 10 mm consistent with borderline central canal stenosis. Disc osteophyte complex encroaches upon and causes moderate bilateral neural foraminal stenosis. Thoracic spine: T9/T10 and T10/T11 borderline central canal stenosis secondary to encroachment by posterior disc osteophyte complex is noted.   Neural foramina appear patent. Lumbar spine: L2/L3: Mild disc height loss is noted with vacuum disc phenomenon in association with posterior disc osteophyte complex which indents the ventral thecal sac narrowing the midline AP thecal sac diameter to 8 mm consistent with moderate central canal stenosis. Disc osteophyte complex encroaches upon and causes severe left neural foraminal narrowing. The right neural foramen is patent. L3/L4: Mild disc height loss, vacuum disc phenomenon is noted in association with posterior disc osteophyte complex which narrows the midline AP thecal sac diameter to 8 mm consistent with moderate central canal stenosis. Disc osteophyte complex encroaches upon and causes moderate bilateral neural foraminal stenosis. L4/L5: Severe disc height loss, vacuum disc phenomenon is noted in association with posterior disc osteophyte complex which narrows the midline AP thecal sac diameter to 10 mm consistent with borderline central canal stenosis. Disc osteophyte complex encroaches upon and causes severe right and moderate left neural foraminal stenosis. L5/S1: Moderate disc height loss is noted in association with posterior disc osteophyte complex which indents the ventral thecal sac narrowing the midline AP thecal sac diameter to 10 mm consistent with borderline central canal stenosis. Disc osteophyte complex encroaches upon and causes severe bilateral neural foraminal stenosis. SOFT TISSUES: There is no prevertebral soft tissue swelling. 1. No acute abnormality of the cervical, thoracic or lumbar spine. 2. C3/C4 borderline, C5/C6 borderline, C6/C7 borderline, T9/T10 borderline, T10/T11 borderline, L2/L3 moderate, L3/L4 moderate, L4/L5 borderline and L5/S1 borderline central canal stenosis secondary to encroachment by posterior disc osteophyte complex.  3. C3/C4 severe right and moderate left, C5/C6 severe bilateral, C6/C7 moderate bilateral, L2/L3 severe left, L3/L4 moderate bilateral, L4/L5 severe right and moderate left, L5/S1 severe bilateral neural foraminal stenosis secondary to encroachment by disc osteophyte complex. 4. Mild lumbar spine dextroscoliosis. 5. Note: Study limited by patient motion related artifact, particularly imaging of the cervical spine. CT THORACIC SPINE TRAUMA RECONSTRUCTION    Result Date: 7/23/2021  EXAMINATION: CT OF THE CERVICAL SPINE WITHOUT CONTRAST; CT OF THE LUMBAR SPINE WITHOUT CONTRAST; CT OF THE THORACIC SPINE WITHOUT CONTRAST 7/23/2021 12:21 am TECHNIQUE: CT of the cervical spine was performed without the administration of intravenous contrast. Multiplanar reformatted images are provided for review. Dose modulation, iterative reconstruction, and/or weight based adjustment of the mA/kV was utilized to reduce the radiation dose to as low as reasonably achievable.; CT of the lumbar spine was performed without the administration of intravenous contrast. Multiplanar reformatted images are provided for review. Dose modulation, iterative reconstruction, and/or weight based adjustment of the mA/kV was utilized to reduce the radiation dose to as low as reasonably achievable.; CT of the thoracic spine was performed without the administration of intravenous contrast. Multiplanar reformatted images are provided for review. Dose modulation, iterative reconstruction, and/or weight based adjustment of the mA/kV was utilized to reduce the radiation dose to as low as reasonably achievable. COMPARISON: None. HISTORY: ORDERING SYSTEM PROVIDED HISTORY: trauma TECHNOLOGIST PROVIDED HISTORY: trauma Decision Support Exception - unselect if not a suspected or confirmed emergency medical condition->Emergency Medical Condition (MA) Reason for Exam: trauma Acuity: Unknown Type of Exam: Unknown Mechanism of Injury: fall FINDINGS: BONES/ALIGNMENT: There is no acute fracture or traumatic malalignment. Mild dextrocurvature of the lumbar spine centered at level L3 is present.  DEGENERATIVE CHANGES: Cervical spine: C3/C4: Moderate disc height loss is present in association with posterior disc osteophyte complex which narrows the midline AP thecal sac diameter to 10 mm consistent with borderline central canal stenosis. Severe right and moderate left neural foraminal stenosis secondary to encroachment by disc osteophyte complex is noted. C5/C6: Moderate disc height loss is present in association with posterior disc osteophyte complex which indents the ventral thecal sac narrowing the midline AP thecal sac diameter to 10 mm consistent with borderline central canal stenosis. Disc osteophyte complex encroaches upon and causes severe bilateral neural foraminal stenosis. C6/C7: Moderate disc height loss is noted at this level in association with posterior disc osteophyte complex which indents the ventral thecal sac narrowing the midline AP thecal sac diameter to 10 mm consistent with borderline central canal stenosis. Disc osteophyte complex encroaches upon and causes moderate bilateral neural foraminal stenosis. Thoracic spine: T9/T10 and T10/T11 borderline central canal stenosis secondary to encroachment by posterior disc osteophyte complex is noted. Neural foramina appear patent. Lumbar spine: L2/L3: Mild disc height loss is noted with vacuum disc phenomenon in association with posterior disc osteophyte complex which indents the ventral thecal sac narrowing the midline AP thecal sac diameter to 8 mm consistent with moderate central canal stenosis. Disc osteophyte complex encroaches upon and causes severe left neural foraminal narrowing. The right neural foramen is patent. L3/L4: Mild disc height loss, vacuum disc phenomenon is noted in association with posterior disc osteophyte complex which narrows the midline AP thecal sac diameter to 8 mm consistent with moderate central canal stenosis. Disc osteophyte complex encroaches upon and causes moderate bilateral neural foraminal stenosis.  L4/L5: Severe disc height loss, vacuum disc phenomenon is noted in association with posterior disc osteophyte complex which narrows the midline AP thecal sac diameter to 10 mm consistent with borderline central canal stenosis. Disc osteophyte complex encroaches upon and causes severe right and moderate left neural foraminal stenosis. L5/S1: Moderate disc height loss is noted in association with posterior disc osteophyte complex which indents the ventral thecal sac narrowing the midline AP thecal sac diameter to 10 mm consistent with borderline central canal stenosis. Disc osteophyte complex encroaches upon and causes severe bilateral neural foraminal stenosis. SOFT TISSUES: There is no prevertebral soft tissue swelling. 1. No acute abnormality of the cervical, thoracic or lumbar spine. 2. C3/C4 borderline, C5/C6 borderline, C6/C7 borderline, T9/T10 borderline, T10/T11 borderline, L2/L3 moderate, L3/L4 moderate, L4/L5 borderline and L5/S1 borderline central canal stenosis secondary to encroachment by posterior disc osteophyte complex. 3. C3/C4 severe right and moderate left, C5/C6 severe bilateral, C6/C7 moderate bilateral, L2/L3 severe left, L3/L4 moderate bilateral, L4/L5 severe right and moderate left, L5/S1 severe bilateral neural foraminal stenosis secondary to encroachment by disc osteophyte complex. 4. Mild lumbar spine dextroscoliosis. 5. Note: Study limited by patient motion related artifact, particularly imaging of the cervical spine.        PROCEDURES:  PROCEDURE NOTE - EMERGENCY INTUBATION    PATIENT NAME: Xxriverside Trauma B  MEDICAL RECORD NO. [id number]  DATE: 7/23/2021  ATTENDING PHYSICIAN: Dr. Geanie Bernheim DIAGNOSIS:  Acute Respiratory Failure  POSTOPERATIVE DIAGNOSIS:  Same  PROCEDURE PERFORMED:  Emergency endotracheal intubation  PERFORMING PHYSICIAN: Claudetta Hough, DO    MEDICATIONS: etomidate intravenously and succinycholine intravenously    DISCUSSION:  Xxriverside Trauma B is a 73y.o.-year-old male who requires intubation and ventilatory support due to Respiratory failure and airway compromise. The history and physical examination were reviewed and confirmed. CONSENT: Unable to be obtained due to the emergent nature of this procedure. PROCEDURE:  A timeout was initiated by the bedside nurse and was confirmed by those present. The patient was placed in the appropriate position with cervical spine immobilization maintained throughout the procedure. Cricoid pressure was not required. Intubation was performed by video laryngoscopy utilizing glide scope and a 7.5 cuffed endotracheal tube. The cuff was then inflated and the tube was secured appropriately at a distance of 24 cm to the dental ridge. Initial confirmation of placement included bilateral breath sounds, an end tidal CO2 detector, absence of sounds over the stomach, tube fogging and adequate pulse oximetry reading. A chest x-ray to verify correct placement of the tube showed appropriate tube position. The patient tolerated the procedure well. COMPLICATIONS:  None     Edelmira Bautista DO  3:32 AM, 7/23/21    PROCEDURE NOTE - CENTRAL VENOUS LINE PLACEMENT    PATIENT NAME: Jose E FAJARDO  MEDICAL RECORD NO. [id number]  DATE: 7/23/2021  ATTENDING PHYSICIAN: Dr. Dre Angelo DIAGNOSIS:  centrally administered medications and trauma  POSTOPERATIVE DIAGNOSIS:  Same  PROCEDURE PERFORMED:  Right Femoral Vein Central Line Insertion  PERFORMING PHYSICIAN: Edelmira Bautista DO  ANESTHESIA:  Local utilizing 1% lidocaine  ESTIMATED BLOOD LOSS:  Less than 25 ml  COMPLICATIONS:  None immediately appreciated. DISCUSSION:  Xxriverside Trauma B is a 73y.o.-year-old male who requires central IV access centrally administered medications and trauma. The history and physical examination were reviewed and confirmed. CONSENT: Unable to be obtained due to the emergent nature of this procedure.      PROCEDURE:  A timeout was initiated by the bedside nurse and was confirmed by those present. The patient was placed in a supine position. The skin overlying the Right Femoral Vein was prepped with chlorhexadine and draped in sterile fashion. The skin was infiltrated with local anesthetic. The vessel and surrounding anatomy was visualized using ultrasound. Through the anesthetized region, the introducer needle was inserted into the femoral vein returning dark red non pulsatile blood. A guidewire was placed through the center of the needle with no resistance. Ultrasound confirmed presence of wire in the vein. A small incision made in the skin with a #11 scalpel blade. The dilator was inserted into the skin and vein over guidewire using Seldinger technique. The dilator was then removed and the 7F 20cm catheter was placed in the vein over the guidewire using Seldinger technique. The guidewire was then removed and all ports aspirated and flushed appropriately. The catheter then secured using silk suture and a temporary sterile dressing was applied. No immediate complication was evident. All sponge, instrument and needle counts were correct at the completion of the procedure. The patient tolerated the procedure well with no immediate complication evident. Lena Riley DO  3:32 AM, 7/23/21         CONSULTS:  IP CONSULT TO PHARMACY  PHARMACY TO CHANGE BASE FLUIDS  IP CONSULT TO PHARMACY  PHARMACY TO CHANGE BASE FLUIDS    CRITICAL CARE:  Please see attending note    FINAL IMPRESSION      1. Intraparenchymal hemorrhage of brain ([facility] [address]          DISPOSITION / PLAN     DISPOSITION Admitted 07/23/2021 02:06:24 AM      PATIENT REFERRED TO:  No follow-up provider specified.     DISCHARGE MEDICATIONS:  New Prescriptions    No medications on file       Lena Riley DO  Emergency Medicine Resident    (Please note that portions of thisnote were completed with a voice recognition program.  Efforts were made to edit the dictations but occasionally words are mis-transcribed.)        Pepper Cespedes DO  Resident  07/23/21 6214

## 2021-07-23 NOTE — ED NOTES
.    7/23/2021 1:21 AM    Patient: Adeline Siegel, 73 y.o., male Race: Cauc  Patient Address: [address] [place] [address]  [id number]  Incident Address:  [x] [employer] @ [address], [address]     [] [facility]  [] [place]  [] [facility] ORTHOPEDIC AND SPINE [facility] [place]   [x] CINTIA GUERRA JR. [facility]  [] [facility]  Patient Phone #: [phone]   Insurance: Payor: /   Gordo Sinha Comp:  []  Yes   [x]  No  Emergency Contact: No emergency contact information on file. MRN: [id number]  [facility]# [id number]  YOB: 1955  Primary Care Physician: No primary care provider on file. Advance Directive: [x] Full Code   []DNR-CC   []DNR-CCA    MSU Crew: Elieser Sanchez, PAMELA Rizvi - Paramedic, DARCY Hill - RN    Pt Transported To:  [x] [facility]  [] [facility]  [] [facility]  [] [facility]  [] [facility]  [] [facility]  [facility] [] [facility] [] [facility]    Was patient transported to closest facility? []Yes  [x]No (if No specify reason)   []   Patient/family request    [x]   Divert to specialist       Response Code   [] 2  [x] 3  []   Change  [] 2  [] 3   Transport Code   [x] 2  [] 3  []   Change  [] 2  [] 3     Mileage:  [address]   Total Miles 21.2     Call Received 7/22/2021 2227   Dispatched 7/22/2021 2227   Enroute 7/22/2021 2231   Arrived Scene 7/22/2021 2252   At Patient 7/22/2021 2253   Decision to Scan 7/22/2021 2254   Scan 7/22/2021 2305   Departed Scene 7/22/2021 [employer] [place] 7/22/2021 2348   Departed Hospital 7/23/2021 0041   In Service 7/23/2021 0041         Allergies  [] Updated/Reviewed by MSU  [x] Historical Data Only  [] Unavailable  has no allergies on file. Medications  [] Updated/Reviewed by MSU  [x] Historical Data Only  [] Unavailable  Prior to Admission medications    Not on File      Past Medical History  [] Updated/Reviewed by MSU  [x] Historical Data Only  [] Unavailable   has no past medical history on file.     Patient Weight: 200 plus   [x]   Estimated   []   Stated          VITALS Time BP Pulse   Resp  Rate N-normal  S-shallow  D-deep Monitor SpO2 O2    LPM BGL   Temp ETCO2   /77 60   NSR   90     2317 ????? 94 26 Shallow NSR        2325 221/90 68 26 Shallow NSR 91% NC 4 L   32   2344 230/119 84 24 Shallow NSR ???? NRB 12 L   ????   2351 207/160 66 17 Shallow NSR ???? NRB 12 L   36      Pupils GCS   Time R L E  4 V  5 M  6 T  15   2253   3 1 6 10   2315   3 1 4 8                                  NIH - UNABLE TO ASSESS ADEQUATELY WITH LOC. record on all transports and Q15 min after tPA administration    NIHSS       Time 2254      1a. LOC - Arousal Status 1      1b. LOC - Questions       1c. LOC - Commands 2      2. Eye Movements (gaze) 1      3. Visual Fields       4. Facial Palsy 2      5a. Motor Left Arm 0      5b. Motor Right Arm 4      6a. Motor Left Leg 2      6b. Motor Right Leg 4      7. Limb Ataxia       8. Sensory       9. Language/Aphasia       10. Dysarthria 2      11. Neglect       TOTAL 18          Research:    RACE Score: 7                 Time: 2253  StrokeVAN Score: Pos     Time: 7791    Time Last Known Well: 2130 hrs    Narrative:    History:    Dispatched to possible CVA per [facility] Dispatch. Arrived to find Denis Vang, 73 y.o., male  With no voiced c/o with altered mental status. Report received from CINTIA GUERRA JR. [facility] [address] at patients side.  at patient side via telemedicine unit @ 2311 hrs. History as given by EMS that patient was home apparently in bathroom taking a bath and according to patient's wife she heard a sound an went to check on him a little later and found him in tub with apparent fall. She found patient with decreased LOC with noted right sided deficits. Medical history of previous TIAs and HTN. Currently taking Eliquis         Physical:    Neuro: Eyes open and will look towards writer on patient's left side with left eye gaze which noted to go to midline but not past midline to right.  Non verbal and minimal response to Imaging:  Images were obtained onboard the MSU including:  [x] CT brain without contrast - see results below:      CT Head WO Contrast    Result Date: 7/23/2021  EXAMINATION: CT OF THE HEAD WITHOUT CONTRAST  7/22/2021 10:58 pm TECHNIQUE: CT of the head was performed without the administration of intravenous contrast. Dose modulation, iterative reconstruction, and/or weight based adjustment of the mA/kV was utilized to reduce the radiation dose to as low as reasonably achievable. COMPARISON: None. HISTORY: ORDERING SYSTEM PROVIDED HISTORY: Stroke TECHNOLOGIST PROVIDED HISTORY: Stroke FINDINGS: BRAIN/VENTRICLES: There are mild generalized involutional change with prominence of ventricles sulci. There is a left thalamic hemorrhage 3.2 x 2.8 cm size with surrounding mass effect and edema. There is evidence of subarachnoid extension of the hemorrhage into the left lateral ventricle. There is bowing of the septum towards the right up to 9 mm blood products identified within the right ventricle. 3rd ventricle is somewhat/like although not well characterized. Periventricular white matter hypoattenuation noted worrisome chronic microvascular disease. ORBITS: The visualized portion of the orbits demonstrate no acute abnormality. SINUSES: Mild ethmoid sinus mucosal thickening. Mastoids grossly clear. SOFT TISSUES/SKULL:  No acute abnormality of the visualized skull or soft tissues. Left thalamic hemorrhage 3.2 cm in size with subarachnoid extension in the ventricular system. Moderate chronic microvascular disease. Dr. Jaciel Stout with aware of the finding at time dictation and was actively treating the patient at time of dictation          IV LINES   Time Size Site # Attempts Performed By   EMS 20 g Left FA 2 EMS   2339 20 g Right wrist 1 S.  Wright, EMTP              Total Amount of IV Fluids Infused: 250 ccs approximately    MEDS / ELECTRICAL RX   Time Therapy Dosage Route Response Performed By   2310 Zofran 4 mgs IVP Initial improve PAMELA Wright, [address] units IVPB Alaris Pump  DARCY Duong, EDMUND /ALANNAH Rutledgenefer Hair   8906 Zofran 4 mgs IVP Slt improve SHERLY TaylorP   5623 Nicardipine 5mg/hr IVPB Alaris Pump No change NATHALIE Taylor/JETHRO, RN                       TPA Administration   Time Bolus Dose Infusion Dose   N/A              [] tPA dosing double checked by:         ACUTE STROKE DYSPHAGIA SCREEN              YES NO   1 GCS less than 13? X    2 Facial Asymmetry or Weakness? 3 Tongue Asymmetry or Weakness? 4 Palatal Asymmetry or Weakness? 5 Signs of aspiration during 3oz water test?*                 If answers to questions 1-4 are NO proceed to 3oz water test               *Administer 3oz of water for sequential drinks, note any throat clearing, cough, or change in vocal quality immediately after and 1 minute following the swallow. If answers to questions 1-5 are NO proceed with ordered PO meds       POC LABS      TIME Not  Captured    Test (reference range)      FSBG ()      PT (11-13.5)      INR (0.8-1.1)      Na (138-146)      K (3.5-4.9)      Cl ()      iCa (1.2-1.32)      TCO2 (24-29)      Glu ()      BUN (8.0-26)      Crea (0.6-1. 3)      Hct (38-51)      Hb (12.0-17)      AnGap 10.0-20)                Assistance:   []    Fire -    []    Police    [x]    Other EMS (See Below)    [address] Notification:    [address] -  [x] [address]  [] [facility]  [] [facility]  [] [facility]  [] [facility] [] [facility] [] [facility] [] [facility]  [] [facility]     [x]    Med Rocael [address] Ch 8 @ 2341 hrs. MAF    []    Cell Phone     Med Control Orders Received:   [] No  [x]  Yes: Dr. Becky Mclean via telemedicine. Med Control Physician (if on line medical direction received)  -        Hospital Team Alert:   [x]    Trauma Alert called by ED    []    STEMI Alert         []    Stroke Alert    [x]    RACE Alert      Description Of Valuables: None noted by writer.      Patient Valuables:   []    With Patient    []    Not Received    []    ER / Lab     Call Outcome:   [x]    Transport to Facility    []    Care Transferred to [facility]    []    Cancelled    []    Patient Refusal    []                           Mobile Stroke Unit    Electronically signed by Marina Griffith, RN on 7/23/21 at 1:21 AM EDT     Penny Carpenter RN  07/23/21 4435

## 2021-07-23 NOTE — ED NOTES
Report called to [facility] REBECCA SLAUGHTER RN on Neuro ICU - Patient to go to floor when bed is ready. ED RN to continue to monitor until bed ready.       Ana Nance RN  07/23/21 1307

## 2021-07-23 NOTE — FLOWSHEET NOTE
[address] [facility] [address]     Emergency/Trauma Note    PATIENT NAME: Franklin Walker   1955  Shift date: 7/23/2021  Shift day: Thursday   Shift # 3    Room # TRAUMA B/TRAUMAB   Name: Natalia Walker    Age: 81 y.o. Gender: male          Jain: No Faith on file   Place of Taoism:     Trauma/Incident type: Adult Trauma Alert  Admit Date & Time: 7/22/2021 11:46 PM  TRAUMA NAME:     ADVANCE DIRECTIVES IN CHART? No    NAME OF DECISION MAKER:     RELATIONSHIP OF DECISION MAKER TO PATIENT:    PATIENT/EVENT DESCRIPTION:  Denis Baker is a 81 y.o. male who arrived via EMS as Trauma Alert. Patient presents as a FALL with a head bleed. 07/23/21 0013   Encounter Summary   Services provided to: Patient; Family   Referral/Consult From: Physician;Multi-disciplinary team   Support System Spouse   Continue Visiting   (7/23/2021)   Complexity of Encounter High   Length of Encounter 1 hour   Spiritual Assessment Completed Yes   Crisis   Type Trauma   Assessment Approachable;Fearful   Intervention Active listening;Sustaining presence/ Ministry of presence   Outcome Expressed gratitude      Pt to be admitted to TRAUMA B/TRAUMAB. SPIRITUAL ASSESSMENT/INTERVENTION:  Graeme Aguilar was ministry of presence. Patient not available.  gathered ID from spouse and gave information to Registration.  spoke to patient's wife Carol Jay [phone]. Spouse stated she found her  in the bathtub and called EMS.  was a calming presence.  provided spiritual and emotional support. PATIENT BELONGINGS:  No belongings noted    ANY BELONGINGS OF SIGNIFICANT VALUE NOTED:  None noted  REGISTRATION STAFF NOTIFIED? Yes      WHAT IS YOUR SPIRITUAL CARE PLAN FOR THIS PATIENT?:  Chaplains will remain available for spiritual and emotional support as needed.   Electronically signed by Dwight Morel Resident, on 7/23/2021 at 12:14 AM.  [address] [employer]  [phone]

## 2021-07-23 NOTE — ED NOTES
Bed: 03  Expected date:   Expected time:   Means of arrival:   Comments:     Jovani Beltran RN  07/23/21 0105

## 2021-07-23 NOTE — FLOWSHEET NOTE
SPIRITUAL CARE DEPARTMENT - [address]  PROGRESS NOTE    Shift date: 7/23/21  Shift day: Friday   Shift # 1    Room # 5741/7160-98   Name: Teagan Cueva            Age: 73 y.o. Gender: male          Synagogue: [employer] of [place]: Unknown    Referral: Routine Visit    Admit Date & Time: 7/22/2021 11:46 PM    PATIENT/EVENT DESCRIPTION:  Teagan Cueva is a 73 y.o. male in room 531. SPIRITUAL ASSESSMENT/INTERVENTION:   entered room and met with spouse and children of parent. Spouse conversed with  and explained what had happened.  offered a prayer with the family.  distributed prayer cards to spouse and family members. Spouse communicated that more family would visit. Spouse is aware that spiritual care is available if needed. SPIRITUAL CARE FOLLOW-UP PLAN:  Chaplains will remain available to offer spiritual and emotional support as needed. Electronically signed by Krishna Godwin, on 7/23/2021 at 1:49 PM.  [facility]  [phone]       07/23/21 1343   Encounter Summary   Services provided to: Patient and family together   Referral/Consult From: Multi-disciplinary team   Support System Spouse; Family members; Children   Continue Visiting   (7/23/21)   Complexity of Encounter High   Length of Encounter 30 minutes   Spiritual Assessment Completed Yes   Routine   Type Follow up   Assessment Unable to respond   Intervention Active listening;Nurtured hope;Prayer;Provided reading materials/devotional materials;Sustaining presence/ Ministry of presence   Outcome   (Patient unable to respond.  Family was grateful for visit.)

## 2021-07-23 NOTE — FLOWSHEET NOTE
Per Dr. Chan Or verbal order   Keep ventric zeroed at the EAM,  open to drain at 20 cm H2O , transduce ICP every hour

## 2021-07-23 NOTE — VIRTUAL HEALTH
Food Insecurity:     Worried About Running Out of Food in the Last Year:     [address] in the Last Year:    Transportation Needs:     Lack of Transportation (Medical):  Lack of Transportation (Non-Medical):    Physical Activity:     Days of Exercise per Week:     Minutes of Exercise per Session:    Stress:     Feeling of Stress :    Social Connections:     Frequency of Communication with Friends and Family:     Frequency of Social Gatherings with Friends and Family:     Attends Taoism Services:     Active Member of Clubs or Organizations:     Attends Club or Organization Meetings:     Marital Status:    Intimate Partner Violence:     Fear of Current or Ex-Partner:     Emotionally Abused:     Physically Abused:     Sexually Abused:      Family History  No family history on file. OBJECTIVE  There were no vitals taken for this visit. Pre-Morbid mRS: 0    Imaging:  Images were personally reviewed with SVEN SIMPSON used to review images including:  CT brain without contrast: left thalamic ICH, IVH  CTA imaging: n/a    Assessment    73year old man with afib on eliquis, now with acute left BG ICH/IVH. NIHSS at least 18 points    Recommendations:  1. NIH 18  2. Recommend Inpatient Neurocritical care admission for further assessment and evaluation   3.  consider . BSMHNOIVTPA  4. Not a tPA candidate due to bleeding  5. Will give k-centra 25 units/kg right now, but the [facility] only has 2500 units max, pt is somewhere between 100-150kg, will give 2500 units k centra now and give another 1500 k centra when he arrives [facility]  6. Vit k 10 units stat  7. There is mild mid line shift and given pt vomitting, there is early sign of herniation, will give 150g of mannitol stat  8.  Keep SBP below 140/90 with labetalol and cardene as needed  9. zofran and reglan for nausea and vomit        Discussed with Stroke team    At least 45 min of critical care time spent through telemedicine robot at patient bedside (via interactive/real-time software) as patient is in imminent and life threatening deterioration with further treatment and evaluation. This Virtual Visit was conducted with patient's (and/or legal guardian's) consent, to provide telestroke consultation and necessary medical care.   Time spent examining patient, reviewing the images personally, reviewing the chart, perform high complexity decision making and speaking with the nursing staff regarding recommendations        Marlys Diamond MD  Stroke, Neurocritical Care And/or [address] [place] [address] Stroke [address]  Electronically signed 7/22/2021 at 11:32 PM

## 2021-07-23 NOTE — PROGRESS NOTES
Date: 7/23/2021  Time: 0005  Patient identity confirmed:  Yes  Indications: airway protection  Preoxygenation: yes    Laryngoscope size and type Glidescope  Airway introducer used: No  Evac: No  ETT size:a 7.5 cuffed  Number of attempts:1   Cords visualized:  [x] Clearly  [] Poorly  Breath sounds present bilaterally: Yes   ETCO2   [x] Positive   ETT secured at  26 at lip    ETT secured with roshan  Chest x-ray ordered: Yes     Difficult airway:    No       If yes, was red tape placed around ETT:   No    Was this a Code Situation:    No                      Procedure performed by: Dr. David Mena RCP  12:21 AM

## 2021-07-23 NOTE — PLAN OF CARE
Problem: OXYGENATION/RESPIRATORY FUNCTION  Goal: Patient will maintain patent airway  7/23/2021 0650 by Bernard Garcia RN  Outcome: Ongoing    Goal: Patient will achieve/maintain normal respiratory rate/effort  Description: Respiratory rate and effort will be within normal limits for the patient  7/23/2021 0650 by Bernard Garcia RN  Outcome: Ongoing    Problem: MECHANICAL VENTILATION  Goal: Patient will maintain patent airway  7/23/2021 0650 by Bernard Garcia RN  Outcome: Ongoing    Goal: Oral health is maintained or improved  7/23/2021 0650 by Bernard Garcia RN  Outcome: Ongoing    Goal: ET tube will be managed safely  7/23/2021 0650 by Bernard Garcia RN  Outcome: Ongoing    Goal: Ability to express needs and understand communication  7/23/2021 0650 by Bernard Garcia RN  Outcome: Ongoing    Goal: Mobility/activity is maintained at optimum level for patient  7/23/2021 0650 by Bernard Garcia RN  Outcome: Ongoing    Problem: SKIN INTEGRITY  Goal: Skin integrity is maintained or improved  7/23/2021 0650 by Bernard Garcia RN  Outcome: Ongoing    Problem: HEMODYNAMIC STATUS  Goal: Patient has stable vital signs and fluid balance  Outcome: Ongoing     Problem: ACTIVITY INTOLERANCE/IMPAIRED MOBILITY  Goal: Mobility/activity is maintained at optimum level for patient  7/23/2021 0650 by Bernard Garcia RN  Outcome: Ongoing    Problem: COMMUNICATION IMPAIRMENT  Goal: Ability to express needs and understand communication  7/23/2021 0650 by Bernard Garcia RN  Outcome: Ongoing

## 2021-07-23 NOTE — PROGRESS NOTES
[address] [facility]  Speech Language Pathology    Date: 7/23/2021  Patient Name: Margy Duong  YOB: 1955   AGE: 73 y.o. MRN: [id number]        Patient Not Available for Speech Therapy     Due to:  [] Testing  [] Hemodialysis  [] Cancelled by RN  [] Surgery   [] Intubation/Sedation/Pain Medication  [] Medical instability  [] Other:    Next scheduled treatment: as medically appropriate    Completed by: [address]  Clinician    Cosigned By: Lauro Adams S.CCC/SLP

## 2021-07-23 NOTE — PROGRESS NOTES
Trauma Tertiary Survey    Admit Date: 7/22/2021  Hospital day 1    Other stroke     No past medical history on file.     Scheduled Meds:   levetiracetam  1,000 mg Intravenous Once    famotidine (PEPCID) injection  20 mg Intravenous BID    levetiracetam  500 mg Intravenous Q12H    chlorhexidine  15 mL Mouth/Throat BID    labetalol  200 mg Per NG tube 3 times per day    insulin lispro  0-6 Units Subcutaneous Q4H     Continuous Infusions:   sodium chloride      sodium chloride      clevidipine 40 mg/hr (07/23/21 0949)    mannitol      fentaNYL 50 mcg/hr (07/23/21 0856)    sodium chloride 50 mL/hr (07/23/21 0217)    dextrose       PRN Meds:acetaminophen, ondansetron **OR** ondansetron, hydrALAZINE, labetalol, glucose, dextrose, glucagon (rDNA), dextrose    Subjective:     Patient is currently intubated and sedated    Objective:     Patient Vitals for the past 8 hrs:   BP Temp Temp src Pulse Resp SpO2 Height Weight   07/23/21 1121 -- -- -- 79 20 97 % -- --   07/23/21 1000 -- -- -- -- -- -- 5' 10\" (1.778 m) 260 lb (117.9 kg)   07/23/21 0800 (!) 162/60 -- -- 75 20 -- -- --   07/23/21 0745 -- -- -- 82 20 99 % -- --   07/23/21 0730 (!) 147/67 -- -- 79 20 99 % -- --   07/23/21 0727 -- -- -- 82 21 99 % -- --   07/23/21 0700 (!) 149/60 -- -- 82 20 99 % -- --   07/23/21 0645 (!) 156/60 -- -- 83 20 100 % -- --   07/23/21 0630 (!) 147/61 -- -- 81 20 100 % -- --   07/23/21 0615 (!) 144/62 -- -- 84 20 100 % -- --   07/23/21 0600 (!) 147/65 -- -- 82 19 100 % -- --   07/23/21 0545 (!) 151/62 -- -- 80 20 100 % -- --   07/23/21 0530 (!) 148/64 -- -- 80 20 100 % -- --   07/23/21 0515 (!) 148/60 -- -- 81 20 100 % -- --   07/23/21 0500 (!) 146/60 -- -- 80 20 100 % -- --   07/23/21 0445 (!) 155/61 -- -- 82 19 100 % -- --   07/23/21 0430 (!) 149/59 -- -- 81 19 100 % -- --   07/23/21 0415 (!) 164/60 95 °F (35 °C) Axillary 81 20 100 % -- --   07/23/21 0400 (!) 153/59 -- -- 85 19 -- -- --   07/23/21 0357 -- -- -- 85 20 -- -- -- I/O last 3 completed shifts:  In: -   Out: 6297 [Urine:6225; Drains:72]  I/O this shift:  In: 132 [IV Piggyback:132]  Out: -     Radiology:    XR CHEST PORTABLE   Final Result   Satisfactory endotracheal and enteric tube placement. Minimal bibasilar atelectasis, unchanged. XR CHEST PORTABLE   Final Result   Unremarkable single portable supine AP view of the chest.         CTA HEAD NECK W CONTRAST   Final Result   Focal small 7 mm diameter blush of active extravasation seen centrally within   the left frontotemporal acute intraparenchymal hemorrhage within the   posterior aspect of the frontal lobe. Finding concerning for rapidly   expanding intraparenchymal acute hemorrhage collection. Unchanged extent of left frontotemporal and left thalamic acute   intraparenchymal hemorrhage with intraventricular extension. Otherwise, unremarkable CT angiogram of the head and neck. CT LUMBAR SPINE TRAUMA RECONSTRUCTION   Final Result   1. No acute abnormality of the cervical, thoracic or lumbar spine. 2. C3/C4 borderline, C5/C6 borderline, C6/C7 borderline, T9/T10 borderline,   T10/T11 borderline, L2/L3 moderate, L3/L4 moderate, L4/L5 borderline and   L5/S1 borderline central canal stenosis secondary to encroachment by   posterior disc osteophyte complex. 3. C3/C4 severe right and moderate left, C5/C6 severe bilateral, C6/C7   moderate bilateral, L2/L3 severe left, L3/L4 moderate bilateral, L4/L5 severe   right and moderate left, L5/S1 severe bilateral neural foraminal stenosis   secondary to encroachment by disc osteophyte complex. 4. Mild lumbar spine dextroscoliosis. 5. Note: Study limited by patient motion related artifact, particularly   imaging of the cervical spine. CT THORACIC SPINE TRAUMA RECONSTRUCTION   Final Result   1. No acute abnormality of the cervical, thoracic or lumbar spine.    2. C3/C4 borderline, C5/C6 borderline, C6/C7 borderline, T9/T10 borderline, T10/T11 borderline, L2/L3 moderate, L3/L4 moderate, L4/L5 borderline and   L5/S1 borderline central canal stenosis secondary to encroachment by   posterior disc osteophyte complex. 3. C3/C4 severe right and moderate left, C5/C6 severe bilateral, C6/C7   moderate bilateral, L2/L3 severe left, L3/L4 moderate bilateral, L4/L5 severe   right and moderate left, L5/S1 severe bilateral neural foraminal stenosis   secondary to encroachment by disc osteophyte complex. 4. Mild lumbar spine dextroscoliosis. 5. Note: Study limited by patient motion related artifact, particularly   imaging of the cervical spine. CT CHEST ABDOMEN PELVIS W CONTRAST   Final Result   No evidence of acute traumatic injury to the chest, abdomen or pelvis. Three-vessel coronary artery disease and moderate to severe atherosclerotic   plaque. XR CHEST PORTABLE   Final Result   Satisfactory position endotracheal tube. Cardiomegaly with perihilar congestion and edema. CT CERVICAL SPINE WO CONTRAST   Final Result   1. No acute abnormality of the cervical, thoracic or lumbar spine. 2. C3/C4 borderline, C5/C6 borderline, C6/C7 borderline, T9/T10 borderline,   T10/T11 borderline, L2/L3 moderate, L3/L4 moderate, L4/L5 borderline and   L5/S1 borderline central canal stenosis secondary to encroachment by   posterior disc osteophyte complex. 3. C3/C4 severe right and moderate left, C5/C6 severe bilateral, C6/C7   moderate bilateral, L2/L3 severe left, L3/L4 moderate bilateral, L4/L5 severe   right and moderate left, L5/S1 severe bilateral neural foraminal stenosis   secondary to encroachment by disc osteophyte complex. 4. Mild lumbar spine dextroscoliosis. 5. Note: Study limited by patient motion related artifact, particularly   imaging of the cervical spine.          CT HEAD WO CONTRAST   Final Result   Addendum 1 of 1   ADDENDUM:   The left frontotemporal lobar acute intraparenchymal hemorrhage also    extends into and involves the left thalamus. Final   1. Left frontotemporal lobar acute intraparenchymal hemorrhage, with   dimensions as discussed above, with surrounding vasogenic edema. 2. Extension of hemorrhage into the left midbrain with surrounding vasogenic   edema. 3. Extension of acute hemorrhage within the ventricular system involving left   greater than right lateral ventricles, 3rd ventricle. 4. Suspected associated CSF outflow obstruction with hydrocephalus involving   the bilateral lateral ventricles. 5. Mass effect causes rightward midline shift at the level of the septum   pellucidum measuring 16 mm consistent with subfalcine herniation. 6. Recommend neurosurgical consultation. 7. Moderate to severe cerebral white matter microvascular ischemic disease. Critical results were called by [facility] Core team to Dr. Laura Martinez On 7/23/2021 at   00:43. Dr. Laura Martinez reportedly unavailable to speak with radiologist but reports   he is aware of findings. MRI brain with and without contrast    (Results Pending)   XR CHEST PORTABLE    (Results Pending)   CT HEAD WO CONTRAST    (Results Pending)   MRI CERVICAL SPINE WO CONTRAST    (Results Pending)         PHYSICAL EXAM:   GCS:  3T    Pupil size:  Left 4 mm Right 4 mm  Pupil reaction: Yes  Wiggles fingers: Left No Right No  Hand grasp:   Left absent    Right absent  Wiggles toes: Left absent intubated and sedated    Right absent intubated and sedated  Plantar flexion: Left absent intubated and sedated  Right absent intubated and sedated    BP (!) 139/49   Pulse 79   Temp 98 °F (36.7 °C) (Axillary)   Resp 20   Ht 5' 10\" (1.778 m)   Wt 260 lb (117.9 kg)   SpO2 97%   BMI 37.31 kg/m²   General appearance: intubated and sedated  Head: no kennedy signs  Eyes: pupils 4mm rafael non reactive   Nose: Nares normal. Septum midline. Mucosa normal. No drainage or sinus tenderness.   Throat: lips, mucosa, and tongue normal; teeth and gums normal  Lungs: clear to auscultation bilaterally  Heart: regular rate and rhythm, S1, S2 normal, no murmur, click, rub or gallop  Abdomen: soft, non distended  Extremities: edema in all 4 extremities  Pulses: 2+ and symmetric  Skin: Skin color, texture, turgor normal. No rashes or lesions  Neurologic: intubated and sedated, GCS 3T    Spine:     Spine Tenderness ROM   Cervical intubated and sedated /10 Not Indicated   Thoracic  intubated and sedated /10 Not Indicated   Lumbar  intubated and sedated  /10 Not Indicated     Musculoskeletal    Joint Tenderness Swelling ROM   Right shoulder  intubated and sedated  absent normal   Left shoulder  intubated and sedated  absent normal   Right elbow  intubated and sedated  absent normal   Left elbow  intubated and sedated  absent normal   Right wrist  intubated and sedated  absent normal   Left wrist  intubated and sedated  absent normal   Right hand grasp  intubated and sedated  absent normal   Left hand grasp  intubated and sedated  absent normal   Right hip  intubated and sedated  absent normal   Left hip  intubated and sedated  absent normal   Right knee  intubated and sedated  absent normal   Left knee  intubated and sedated  absent normal   Right ankle  intubated and sedated  absent normal   Left ankle intubated and sedated  absent normal   Right foot  intubated and sedated  absent normal   Left foot  intubated and sedated  absent normal           CONSULTS: neuro critical care     INJURIES:      Patient Active Problem List   Diagnosis    Stroke due to occlusion of right middle cerebral artery (HCC)         Assessment/Plan:     Trauma  service will sign off at this time. Thank you for the consult. Please call/page us if you have any questions/concerns. We appreciate being involved in the care of your patient.     Plan:     Neurocritical care to continue patient management       Electronically signed by Misty Lopez MD on 7/23/2021 at 11:30 AM

## 2021-07-23 NOTE — FLOWSHEET NOTE
SPIRITUAL CARE DEPARTMENT - [address]  PROGRESS NOTE    Shift date: 7/23/21  Shift day: Friday   Shift # 2    Room # 2136/3606-37   Name: Kevin Holm            Age: 73 y.o. Gender: male          Zoroastrianism: Unknown   Place of Jehovah's witness: Unknown    Referral:  Routine Visit    Admit Date & Time: 7/22/2021 11:46 PM    PATIENT/EVENT DESCRIPTION:  Kevin Holm is a 73 y.o. male in room 531. SPIRITUAL ASSESSMENT/INTERVENTION:   entered room with awareness of extubation. Nurse notified  to provide visitation as needed. Pt's son requested that I say a prayer for the Pt.  prayed for the family and provided spiritual encouragement to the family members. Family were grieving their loved one's life.  will be available as needed for spiritual care to family. SPIRITUAL CARE FOLLOW-UP PLAN:  Chaplains will remain available to offer spiritual and emotional support as needed.       Electronically signed by Steven Godwin, on 7/23/2021 at 4:30 PM.  [facility]  [phone]

## 2021-07-24 NOTE — PLAN OF CARE
Problem: SKIN INTEGRITY  Goal: Skin integrity is maintained or improved  7/23/2021 2202 by Geoffrey Cam RN  Outcome: Ongoing     Problem: Falls - Risk of:  Goal: Will remain free from falls  Description: Will remain free from falls  7/23/2021 2202 by Geoffrey Cam RN  Outcome: Ongoing     Problem: Pain:  Goal: Pain level will decrease  Description: Pain level will decrease  Outcome: Ongoing     Problem: Pain:  Goal: Control of acute pain  Description: Control of acute pain  Outcome: Ongoing

## 2021-07-24 NOTE — SIGNIFICANT EVENT
ICU DEATH NOTE    PATIENT NAME: Rm London  YOB: 1955  MEDICAL RECORD NO. [id number]  DATE: 7/23/2021  PRIMARY CARE PHYSICIAN: Jose Manuel Clarke MD    DIAGNOSIS OF DEATH     I have confirmed the death of this patient in accordance with accepted medical standards.   The patient is dead as evidenced by cardiac death or cessation of brain function:    Cardiac Death (check all that apply):     [x]  Absence of respiratory effort by observation     [x]  Absence of pulse by palpation     [x]  Absence of blood pressure by sphygmomanometry     [x]  Absence of sustainable cardiac rhythm by monitor    Death by Cessation of Brain Function (check all that apply):     [x]  Absence of Cerebral Function with no motor response     [x]  Absence of brain stem function by systemic physical exam     [x]  Failure to respond with respiratory drive by apnea test     []  Cerebral Electrical silence as interpreted by qualified reader        OR     []  Absence of brain blood flow by radiologic technique      CERTIFICATION OF DEATH     I have pronounced the patient dead on:      Date: 7/23/2021 at 10:47 PM    NOTIFICATIONS     Attending physician that will sign Death Certificate: Dr Alphonso Angeles and Dr Amezcua Him notified: Name and/or Relationship: Yes, Wife                                                         [x]  Per Nursing     notified (Name):                                                        [x]  Per Nursing       Samer Eamon Lawson MD   Neuro Critical Care Resident,   Department of Internal Medicine/ Critical care  [address] [place] [address])   7/23/2021, 11:14 PM

## 2021-07-24 NOTE — FLOWSHEET NOTE
[facility] - [address]   Patient Death Note  DEATH   Shift date: 2021    Shift day: Friday  Shift #: 3                 Room # 0497/1248-50   Name: Je Mtz            Age: 73 y.o. Gender: male          Judaism: No Taoism on file      Place of Church:   Admit Date & Time: 2021 11:46 PM     Referral:   Actual date of death: 2021   TOD: 10:47 P. M.       SITUATION AT DEATH:  Patient was terminally extubated. Patient's code status was DNR-CC. Patient was declared dead by determination of cardiac death, and cessation of brain function by  [address]  at 10:47 p.m. IS THIS A 'S CASE? No    SPIRITUAL/EMOTIONAL INTERVENTION:   at bedside in room 531.  met Patient's wife [place] and her two sons.  read scripture and prayed. After patient declared   conducted end of life ritual. Patient listened compassionately to family members. Family given grief /comfort blanket and grief packet. Family Received Grief Packet? Yes       NAME AND PHONE NUMBER OF DOCTOR SIGNING DEATH CERTIFICATE:  Dr. Anthony Mendez  [facility]  [facility]. (Please note which nurse you spoke with to confirm the  home will be contacted. Family should not be listed)    Copy of COMPLETED Release of Body Form Received? Yes    Patient's belongings: With Family     HOME:  Name: [address] [place] [address] Street: [address] [place] [address] Street [address] [place] [address]  Phone Number: [phone]    NEXT OF KIN:  Name:Mrs. Shahzad Walker  Relationship: Wife  Street Address: [address] [place] [address]   Zip code: [address]   Phone Number: [phone]    ANY FOLLOW-UP NEEDED?   No    IF SO, WHAT?  NONE    Electronically signed by Tali Godwin, on 2021 at 11:57 PM.  [facility] She[facility]on  [phone]

## 2021-07-24 NOTE — PLAN OF CARE
Problem: Pain:  Goal: Pain level will decrease  Description: Pain level will decrease  7/24/2021 0141 by Mely Meyer RN  Outcome: Completed  7/23/2021 2202 by Mely Meyer RN  Outcome: Ongoing

## 2021-07-29 NOTE — DISCHARGE SUMMARY
Discharge note    Patient Name: Clyde Conway  Patient : 1955  Room/Bed: 70/5831-05  Allergies: Not on File   Dispo; death  Admission time   Discharge time   Problem List:   Patient Active Problem List   Diagnosis    Stroke due to occlusion of right middle cerebral artery ([address]    Intraparenchymal hemorrhage of brain ([address]    Ventilator dependence ([address]    Brainstem herniation ([address]       CHIEF COMPLAINT     Right hemiplegia, complete aphasia    HPI    History Obtained From: Medical chart and medical staff    The patient is a 73 y.o. male with past medical history of hypertension, hyperlipidemia, right frontal stroke in 2018, atrial fibrillation on Eliquis presented with mobile stroke unit with right hemiplegia and complete expressive aphasia. LKW 9:30 pm. Compliant with eliquis. Initial NIHSS is 18. CTH in the [facility] showed Left thalamic hemorrhage 3.2 cm in size with subarachnoid extension in the   ventricular system. Initial SBP >200. Cardene was started. The pt was transferred to [facility] for further management. In route, the patient received 2500 units of Kcentra. At bedside, the patient was intubated and received paralytics. Of sedation. Pupils were 4 mm bilaterally and nonreactive. Repeated CT head was done and showed Left frontotemporal lobar acute intraparenchymal hemorrhage,  with surrounding vasogenic edema. Extension of hemorrhage into the left midbrain with surrounding vasogenic edema. Extension of acute hemorrhage within the ventricular system involving left greater than right lateral ventricles, 3rd ventricle. CTA of the head and neck with spot sign indicating active bleeding. 1500 units of Kcentra, mannitol 150 g and 23.4% hypertonic saline 60 mL were ordered stat. Neurosurgery was consulted stat and recommended EVD. EVD was placed in ED. CBC and BMP within normal limits with sodium around 139.        IVH      No                                     Yes      [] 0  [x] 1   Location    Supratentorial Origin                   Infratentorial Origin     [] 0  [x] 1   TOTAL 4   0= 10%             1=13%           2= 26%              3=72%             4 = 97%         5 & 6 = 100%    Initial CT head in the [facility]      Repeated [address] progress;  Around 11 AM, on exam:    Pupillometer b/l non reactive ,    Cornea+, cough+     - Received bolus of mannitol  120 gm ,  -23.4 % Nacl 60 ml    - 240 meq of Nacl     CT head was done again around 11 AM and showed massive left supratentorial intraparenchymal hemorrhage with severe rightward midline shift and subfalcine herniation. Worsening dilatation of the temporal horns of the lateral ventricles. Increased hemorrhage in the midbrain. Increase IVH . Discussion was done with the family and the patient wanted to change the CODE STATUS to DNR CC. Palliative care was consulted. On 7/23/2021 at 10: 40 7 PM, the patient was pronounced dead. PATIENT HISTORY   Past Medical History:    No past medical history on file. Past Surgical History:    No past surgical history on file. Social History:   Social History     Socioeconomic History    Marital status:      Spouse name: Not on file    Number of children: Not on file    Years of education: Not on file    Highest education level: Not on file   Occupational History    Not on file   Tobacco Use    Smoking status: Not on file   Substance and Sexual Activity    Alcohol use: Not on file    Drug use: Not on file    Sexual activity: Not on file   Other Topics Concern    Not on file   Social History Narrative    Not on file     Social Determinants of Health     Financial Resource Strain:     Difficulty of Paying Living Expenses:    Food Insecurity:     Worried About Running Out of Food in the Last Year:     [address] in the Last Year:    Transportation Needs:     Lack of Transportation (Medical):      Lack of Transportation (Non-Medical): Physical Activity:     Days of Exercise per Week:     Minutes of Exercise per Session:    Stress:     Feeling of Stress :    Social Connections:     Frequency of Communication with Friends and Family:     Frequency of Social Gatherings with Friends and Family:     Attends Hinduism Services:     Active Member of Clubs or Organizations:     Attends Club or Organization Meetings:     Marital Status:    Intimate Partner Violence:     Fear of Current or Ex-Partner:     Emotionally Abused:     Physically Abused:     Sexually Abused:        Family History:   No family history on file. Allergies:    Patient has no allergy information on record. Medications Prior to Admission:    No medications prior to admission. Current Medications:  No current facility-administered medications for this encounter. LABS AND IMAGING:     RECENT LABS:  CBC with Differential:    Lab Results   Component Value Date    WBC 20.2 07/23/2021    RBC 4.79 07/23/2021    HGB 14.6 07/23/2021    HCT 45.3 07/23/2021     07/23/2021    MCV 94.6 07/23/2021    MCH 30.5 07/23/2021    MCHC 32.2 07/23/2021    RDW 13.3 07/23/2021     BMP:    Lab Results   Component Value Date     07/23/2021    K 4.0 07/23/2021     07/23/2021    CO2 18 07/23/2021    BUN 11 07/23/2021    CREATININE 0.44 07/23/2021    CALCIUM 8.9 07/23/2021    GFRAA >60 07/23/2021    LABGLOM >60 07/23/2021    GLUCOSE 228 07/23/2021       RADIOLOGY:   CT HEAD WO CONTRAST    Addendum Date: 7/23/2021    ADDENDUM: The left frontotemporal lobar acute intraparenchymal hemorrhage also extends into and involves the left thalamus.      Result Date: 7/23/2021  EXAMINATION: CT OF THE HEAD WITHOUT CONTRAST  7/23/2021 12:21 am TECHNIQUE: CT of the head was performed without the administration of intravenous contrast. Dose modulation, iterative reconstruction, and/or weight based adjustment of the mA/kV was utilized to reduce the radiation dose to as low as reasonably achievable. COMPARISON: None. HISTORY: ORDERING SYSTEM PROVIDED HISTORY: trauma TECHNOLOGIST PROVIDED HISTORY: trauma Reason for Exam: trauma Acuity: Unknown Type of Exam: Unknown Mechanism of Injury: fall FINDINGS: BRAIN/VENTRICLES: Large lobar intraparenchymal acute hemorrhage is seen involving the left frontotemporal lobes which measures 7.0 cm anteroposterior by 3.8 cm transverse by 5.4 cm craniocaudad with extension of acute hemorrhage into the ventricular system involving the left greater than right lateral, 3rd ventricles. Extension of hemorrhage into the left midbrain is also seen with surrounding vasogenic edema present. Mass effect related to in ventricular hemorrhage and the lobar hemorrhage causes rightward midline shift at the level of the septum pellucidum measuring 16 mm consistent with subfalcine herniation. Prominence of the lateral ventricles is seen suggesting CSF outflow obstruction. Multifocal hypoattenuation within cerebral white matter is seen consistent with moderate to severe microvascular ischemic disease. ORBITS: The visualized portion of the orbits demonstrate no acute abnormality. SINUSES: The visualized paranasal sinuses and mastoid air cells demonstrate no acute abnormality. SOFT TISSUES/SKULL:  No acute abnormality of the visualized skull or soft tissues. 1. Left frontotemporal lobar acute intraparenchymal hemorrhage, with dimensions as discussed above, with surrounding vasogenic edema. 2. Extension of hemorrhage into the left midbrain with surrounding vasogenic edema. 3. Extension of acute hemorrhage within the ventricular system involving left greater than right lateral ventricles, 3rd ventricle. 4. Suspected associated CSF outflow obstruction with hydrocephalus involving the bilateral lateral ventricles. 5. Mass effect causes rightward midline shift at the level of the septum pellucidum measuring 16 mm consistent with subfalcine herniation.  6. Recommend neurosurgical consultation. 7. Moderate to severe cerebral white matter microvascular ischemic disease. Critical results were called by [facility] Core team to Dr. Karen Gilliland On 7/23/2021 at 00:43. Dr. Karen Gilliland reportedly unavailable to speak with radiologist but reports he is aware of findings. XR CHEST PORTABLE    Result Date: 7/23/2021  EXAMINATION: ONE XRAY VIEW OF THE CHEST 7/23/2021 12:32 am COMPARISON: None. HISTORY: ORDERING SYSTEM PROVIDED HISTORY: trauma TECHNOLOGIST PROVIDED HISTORY: trauma Reason for Exam: supine,et placement, poss stroke  fall Acuity: Unknown Type of Exam: Unknown FINDINGS: Enteric tube terminates 3 cm above the joel. Mile perihilar pulmonary vasculature and edema. Heart is mildly large in size. Enteric tube tip not visualized. However the enteric tube extends below the level of the hemidiaphragms. Minimal atelectasis     Satisfactory position endotracheal tube. Cardiomegaly with perihilar congestion and edema. CTA HEAD NECK W CONTRAST    Result Date: 7/23/2021  EXAMINATION: CTA OF THE HEAD AND NECK WITH CONTRAST 7/23/2021 12:24 am: TECHNIQUE: CTA of the head and neck was performed with the administration of intravenous contrast. Multiplanar reformatted images are provided for review. MIP images are provided for review. Stenosis of the internal carotid arteries measured using NASCET criteria. Dose modulation, iterative reconstruction, and/or weight based adjustment of the mA/kV was utilized to reduce the radiation dose to as low as reasonably achievable. COMPARISON: None. HISTORY: ORDERING SYSTEM PROVIDED HISTORY: Head bleed TECHNOLOGIST PROVIDED HISTORY: Head bleed Decision Support Exception - unselect if not a suspected or confirmed emergency medical condition->Emergency Medical Condition (MA) FINDINGS: CTA NECK: AORTIC ARCH/ARCH VESSELS: No dissection or arterial injury. No significant stenosis of the brachiocephalic or subclavian arteries.  CAROTID ARTERIES: No dissection, arterial injury, or hemodynamically significant stenosis by NASCET criteria. VERTEBRAL ARTERIES: No dissection, arterial injury, or significant stenosis. SOFT TISSUES: The lung apices are clear. No cervical or superior mediastinal lymphadenopathy. The larynx and pharynx are unremarkable. No acute abnormality of the salivary and thyroid glands. BONES: No acute osseous abnormality. CTA HEAD: ANTERIOR CIRCULATION: No significant stenosis of the intracranial internal carotid, anterior cerebral, or middle cerebral arteries. No aneurysm. POSTERIOR CIRCULATION: No significant stenosis of the vertebral, basilar, or posterior cerebral arteries. No aneurysm. OTHER: No dural venous sinus thrombosis on this non-dedicated study. BRAIN: Redemonstration of the left frontotemporal and thalamic acute intraparenchymal hemorrhage with surrounding vasogenic edema is seen with extension into the ventricular system involving the left greater than right lateral ventricle and 3rd ventricles. Suspected CSF outflow obstruction persists with asymmetric enlargement of the bilateral lateral ventricles. Focal small blush of active extravasation is noted centrally within the posterior left frontal portion of the acute intraparenchymal hemorrhage measuring 7 mm in diameter. Focal small 7 mm diameter blush of active extravasation seen centrally within the left frontotemporal acute intraparenchymal hemorrhage within the posterior aspect of the frontal lobe. Finding concerning for rapidly expanding intraparenchymal acute hemorrhage collection. Unchanged extent of left frontotemporal and left thalamic acute intraparenchymal hemorrhage with intraventricular extension. Otherwise, unremarkable CT angiogram of the head and neck.      CT CHEST ABDOMEN PELVIS W CONTRAST    Result Date: 7/23/2021  EXAMINATION: CT OF THE CHEST, ABDOMEN, AND PELVIS WITH CONTRAST 7/23/2021 12:30 am TECHNIQUE: CT of the chest, abdomen and pelvis was performed with the Peritoneum/Retroperitoneum: Moderate to severe atherosclerotic plaque. No hemoperitoneum or pneumoperitoneum. No mesenteric hematoma. Bones/Soft Tissues: No pelvic fracture. Please see separately dictated lumbar spine CT for findings in this region. No evidence of acute traumatic injury to the chest, abdomen or pelvis. Three-vessel coronary artery disease and moderate to severe atherosclerotic plaque.          Lupis Martinez MD  Neuro Critical Care Service   7/28/2021     11:03 PM

## 2021-08-05 NOTE — PROCEDURES
Zoe Hanna is a 73 y.o. male patient. 1. Intraparenchymal hemorrhage of brain ([facility] [address]      No past medical history on file. Blood pressure (!) 167/84, pulse 113, temperature 99.7 °F (37.6 °C), temperature source Infrared, resp. rate 20, height 5' 10\" (1.778 m), weight 260 lb (117.9 kg), SpO2 (!) 86 %. Procedures Insert EVD    Under local anesthesia a scalp incision was made and a twist drill craniostomy performed. The ventricular catheter was passed into the lateral ventricle without difficulty. Good flow of bloody CSF was obtained, the drain was tunneled and exited through a separate incision and secured and then connected to the [place] drainage bag. The scalp incision was closed and a sterile dressing applied.     Adali Palm MD  8/5/2021